# Patient Record
Sex: MALE | Race: WHITE | Employment: OTHER | URBAN - METROPOLITAN AREA
[De-identification: names, ages, dates, MRNs, and addresses within clinical notes are randomized per-mention and may not be internally consistent; named-entity substitution may affect disease eponyms.]

---

## 2017-01-04 ENCOUNTER — TELEPHONE (OUTPATIENT)
Dept: SURGERY | Age: 67
End: 2017-01-04

## 2017-03-17 ENCOUNTER — OFFICE VISIT (OUTPATIENT)
Dept: SURGERY | Age: 67
End: 2017-03-17

## 2017-03-17 VITALS
SYSTOLIC BLOOD PRESSURE: 111 MMHG | WEIGHT: 184 LBS | HEART RATE: 70 BPM | BODY MASS INDEX: 24.92 KG/M2 | HEIGHT: 72 IN | DIASTOLIC BLOOD PRESSURE: 65 MMHG

## 2017-03-17 DIAGNOSIS — Z72.0 TOBACCO ABUSE: ICD-10-CM

## 2017-03-17 DIAGNOSIS — Z93.3 COLOSTOMY IN PLACE (HCC): Primary | ICD-10-CM

## 2017-03-17 PROCEDURE — 99214 OFFICE O/P EST MOD 30 MIN: CPT | Performed by: SURGERY

## 2017-03-17 ASSESSMENT — ENCOUNTER SYMPTOMS
ALLERGIC/IMMUNOLOGIC NEGATIVE: 1
RESPIRATORY NEGATIVE: 1
EYES NEGATIVE: 1

## 2017-03-20 ENCOUNTER — TELEPHONE (OUTPATIENT)
Dept: SURGERY | Age: 67
End: 2017-03-20

## 2017-03-20 NOTE — TELEPHONE ENCOUNTER
Returned the call to the patient, patient states he has an appointment for his H&P scheduled for 3/30/2017, patient has been given a tentative date for surgery of 4/12/2017, patient was ok with the information provided.

## 2017-03-28 ENCOUNTER — TELEPHONE (OUTPATIENT)
Dept: SURGERY | Age: 67
End: 2017-03-28

## 2017-04-10 ENCOUNTER — TELEPHONE (OUTPATIENT)
Dept: SURGERY | Age: 67
End: 2017-04-10

## 2017-04-11 ENCOUNTER — TELEPHONE (OUTPATIENT)
Dept: SURGERY | Age: 67
End: 2017-04-11

## 2017-04-12 ENCOUNTER — PAT TELEPHONE (OUTPATIENT)
Dept: PREADMISSION TESTING | Age: 67
End: 2017-04-12

## 2017-04-12 VITALS — WEIGHT: 180 LBS | HEIGHT: 72 IN | BODY MASS INDEX: 24.38 KG/M2

## 2017-04-12 RX ORDER — ACETAMINOPHEN 160 MG
2000 TABLET,DISINTEGRATING ORAL DAILY
Status: ON HOLD | COMMUNITY
Start: 2017-02-20 | End: 2021-01-27

## 2017-04-13 ENCOUNTER — SURG/PROC ORDERS (OUTPATIENT)
Dept: ANESTHESIOLOGY | Age: 67
End: 2017-04-13

## 2017-04-13 RX ORDER — SODIUM CHLORIDE 0.9 % (FLUSH) 0.9 %
10 SYRINGE (ML) INJECTION PRN
Status: CANCELLED | OUTPATIENT
Start: 2017-04-13

## 2017-04-13 RX ORDER — SODIUM CHLORIDE 9 MG/ML
INJECTION, SOLUTION INTRAVENOUS CONTINUOUS
Status: CANCELLED | OUTPATIENT
Start: 2017-04-13

## 2017-04-13 RX ORDER — SODIUM CHLORIDE 0.9 % (FLUSH) 0.9 %
10 SYRINGE (ML) INJECTION EVERY 12 HOURS SCHEDULED
Status: CANCELLED | OUTPATIENT
Start: 2017-04-13

## 2017-04-24 ENCOUNTER — TELEPHONE (OUTPATIENT)
Dept: SURGERY | Age: 67
End: 2017-04-24

## 2017-05-01 ENCOUNTER — OFFICE VISIT (OUTPATIENT)
Dept: SURGERY | Age: 67
End: 2017-05-01

## 2017-05-01 VITALS
DIASTOLIC BLOOD PRESSURE: 87 MMHG | BODY MASS INDEX: 24.38 KG/M2 | WEIGHT: 180 LBS | HEIGHT: 72 IN | HEART RATE: 79 BPM | SYSTOLIC BLOOD PRESSURE: 135 MMHG

## 2017-05-01 DIAGNOSIS — K63.1 PERFORATED SIGMOID COLON (HCC): Primary | ICD-10-CM

## 2017-05-01 PROCEDURE — 99024 POSTOP FOLLOW-UP VISIT: CPT | Performed by: NURSE PRACTITIONER

## 2017-05-01 ASSESSMENT — ENCOUNTER SYMPTOMS: COLOR CHANGE: 1

## 2017-05-26 ENCOUNTER — OFFICE VISIT (OUTPATIENT)
Dept: SURGERY | Age: 67
End: 2017-05-26

## 2017-05-26 VITALS
BODY MASS INDEX: 25.47 KG/M2 | HEIGHT: 72 IN | WEIGHT: 188 LBS | HEART RATE: 72 BPM | SYSTOLIC BLOOD PRESSURE: 100 MMHG | DIASTOLIC BLOOD PRESSURE: 60 MMHG

## 2017-05-26 DIAGNOSIS — I10 ESSENTIAL HYPERTENSION: ICD-10-CM

## 2017-05-26 DIAGNOSIS — I48.0 PAROXYSMAL ATRIAL FIBRILLATION (HCC): ICD-10-CM

## 2017-05-26 DIAGNOSIS — K43.2 INCISIONAL HERNIA, WITHOUT OBSTRUCTION OR GANGRENE: ICD-10-CM

## 2017-05-26 DIAGNOSIS — Z09 S/P COLOSTOMY, FOLLOW-UP EXAM: Primary | ICD-10-CM

## 2017-05-26 PROCEDURE — 99024 POSTOP FOLLOW-UP VISIT: CPT | Performed by: SURGERY

## 2017-05-26 ASSESSMENT — ENCOUNTER SYMPTOMS
GASTROINTESTINAL NEGATIVE: 1
RESPIRATORY NEGATIVE: 1
ALLERGIC/IMMUNOLOGIC NEGATIVE: 1

## 2017-07-04 PROBLEM — J90 PLEURAL EFFUSION: Status: ACTIVE | Noted: 2017-07-04

## 2017-07-04 PROBLEM — J81.0 ACUTE PULMONARY EDEMA (HCC): Status: ACTIVE | Noted: 2017-07-04

## 2017-07-04 PROBLEM — J96.02 ACUTE RESPIRATORY ACIDOSIS (HCC): Status: ACTIVE | Noted: 2017-07-04

## 2017-07-04 PROBLEM — E78.5 HYPERLIPIDEMIA: Status: ACTIVE | Noted: 2017-07-04

## 2017-07-04 PROBLEM — J43.2 CENTRILOBULAR EMPHYSEMA (HCC): Status: ACTIVE | Noted: 2017-07-04

## 2017-07-04 PROBLEM — J96.02 ACUTE RESPIRATORY FAILURE WITH HYPOXIA AND HYPERCARBIA (HCC): Status: ACTIVE | Noted: 2017-07-04

## 2017-07-04 PROBLEM — I50.23 ACUTE ON CHRONIC SYSTOLIC (CONGESTIVE) HEART FAILURE (HCC): Status: ACTIVE | Noted: 2017-07-04

## 2017-07-04 PROBLEM — J96.01 ACUTE RESPIRATORY FAILURE WITH HYPOXIA AND HYPERCARBIA (HCC): Status: ACTIVE | Noted: 2017-07-04

## 2017-08-03 ENCOUNTER — TELEPHONE (OUTPATIENT)
Dept: SURGERY | Age: 67
End: 2017-08-03

## 2017-08-11 ENCOUNTER — OFFICE VISIT (OUTPATIENT)
Dept: SURGERY | Age: 67
End: 2017-08-11

## 2017-08-11 VITALS
DIASTOLIC BLOOD PRESSURE: 72 MMHG | WEIGHT: 187 LBS | BODY MASS INDEX: 25.33 KG/M2 | HEIGHT: 72 IN | SYSTOLIC BLOOD PRESSURE: 105 MMHG | HEART RATE: 107 BPM

## 2017-08-11 DIAGNOSIS — R10.30 LOWER ABDOMINAL PAIN: Primary | ICD-10-CM

## 2017-08-11 DIAGNOSIS — Z09 S/P COLOSTOMY, FOLLOW-UP EXAM: ICD-10-CM

## 2017-08-11 DIAGNOSIS — K43.2 INCISIONAL HERNIA, WITHOUT OBSTRUCTION OR GANGRENE: ICD-10-CM

## 2017-08-11 PROCEDURE — 4040F PNEUMOC VAC/ADMIN/RCVD: CPT | Performed by: SURGERY

## 2017-08-11 PROCEDURE — 3017F COLORECTAL CA SCREEN DOC REV: CPT | Performed by: SURGERY

## 2017-08-11 PROCEDURE — 4004F PT TOBACCO SCREEN RCVD TLK: CPT | Performed by: SURGERY

## 2017-08-11 PROCEDURE — 1123F ACP DISCUSS/DSCN MKR DOCD: CPT | Performed by: SURGERY

## 2017-08-11 PROCEDURE — G8427 DOCREV CUR MEDS BY ELIG CLIN: HCPCS | Performed by: SURGERY

## 2017-08-11 PROCEDURE — 99213 OFFICE O/P EST LOW 20 MIN: CPT | Performed by: SURGERY

## 2017-08-11 PROCEDURE — G8419 CALC BMI OUT NRM PARAM NOF/U: HCPCS | Performed by: SURGERY

## 2017-08-11 ASSESSMENT — ENCOUNTER SYMPTOMS
NAUSEA: 0
ABDOMINAL PAIN: 1
EYE DISCHARGE: 0
RECTAL PAIN: 0
PHOTOPHOBIA: 0
VOMITING: 0
CONSTIPATION: 0
BACK PAIN: 1
EYE ITCHING: 0
RESPIRATORY NEGATIVE: 1
BLOOD IN STOOL: 0
EYE PAIN: 0
EYE REDNESS: 0
DIARRHEA: 0
ALLERGIC/IMMUNOLOGIC NEGATIVE: 1
ANAL BLEEDING: 0

## 2017-10-19 ENCOUNTER — TELEPHONE (OUTPATIENT)
Dept: SURGERY | Age: 67
End: 2017-10-19

## 2017-10-19 NOTE — TELEPHONE ENCOUNTER
Returned the call to the patient, he indicates he will contact his primary care physician to be scheduled for his Pre-Op H&P as well as his cardiologist for cardiac clearance.  Patients states he will contact our office once the two apts have been scheduled to then schedule the procedure for his hernia repair

## 2017-10-19 NOTE — LETTER
4. I have also been informed by the informing physician that there are other risks from both known and unknown causes that are attendant to the performance of any surgical procedure. I am aware that the practice of medicine and surgery is not an exact science, and that no guarantees have been made to me concerning the results of the operation and/or procedure(s). 5. I   CONSENT / REFUSE CONSENT  (strike the phrase that does not apply) to the taking of photographs before, during and/or after the operation or procedure for scientific/educational purposes. 6. I consent to the administration of anesthesia and to the use of such anesthetics as may be deemed advisable by the anesthesiologist who has been engaged by me or my physician. 7. I certify that I have read and understand the above consent to operation and/or other procedure(s); that the explanations therein referred to were made to me by the informing physician in advance of my signing this consent; that all blanks or statements requiring insertion or completion were filled in and inapplicable paragraphs, if any, were stricken before I signed; and that all questions asked by me about the operation and/or procedure(s) which I have consented to have been fully answered in a satisfactory manner.           _______________________  Witness        Signature Of Patient      _______________________  Informing Physician         Signature of Informing Physician           If patient is unable to sign or is a minor, complete one of the following:    (A)  Patient is a minor   years of age. (B)  Patient is unable to sign because: The undersigned represents that he or she is duly authorized to execute this consent for and on behalf of the above named patient.                Witness               o  Parent  o  Guardian   o  Spouse       o  Other (specify)

## 2017-10-31 ENCOUNTER — TELEPHONE (OUTPATIENT)
Dept: SURGERY | Age: 67
End: 2017-10-31

## 2017-10-31 NOTE — TELEPHONE ENCOUNTER
Patient called stating he had his H&P performed as of 10/30/17 with his PCP, he mentions he has the apt with his cardiologist next week and once he has been cleared for the procedure of Incisional/ Umbilical Hernia Repair with MESH, he will contact our office to be scheduled for the Sx. Patient also mentions he will bring the clearance summary from his PCP to our office next week also.

## 2017-11-06 ENCOUNTER — TELEPHONE (OUTPATIENT)
Dept: SURGERY | Age: 67
End: 2017-11-06

## 2017-11-06 NOTE — TELEPHONE ENCOUNTER
Call has been placed to the patient, advising the patient to return the call regarding scheduling his hernia operation.  Pt did stop into the office today to bring Pre-Op paper work

## 2017-11-10 ENCOUNTER — TELEPHONE (OUTPATIENT)
Dept: SURGERY | Age: 67
End: 2017-11-10

## 2017-11-13 ENCOUNTER — PAT TELEPHONE (OUTPATIENT)
Dept: PREADMISSION TESTING | Age: 67
End: 2017-11-13

## 2017-11-13 ENCOUNTER — TELEPHONE (OUTPATIENT)
Dept: SURGERY | Age: 67
End: 2017-11-13

## 2017-11-13 VITALS — BODY MASS INDEX: 25.06 KG/M2 | HEIGHT: 72 IN | WEIGHT: 185 LBS

## 2017-11-13 ASSESSMENT — PAIN DESCRIPTION - LOCATION: LOCATION: BACK

## 2017-11-13 ASSESSMENT — PAIN - FUNCTIONAL ASSESSMENT: PAIN_FUNCTIONAL_ASSESSMENT: 0-10

## 2017-11-13 ASSESSMENT — PAIN SCALES - GENERAL: PAINLEVEL_OUTOF10: 6

## 2017-11-13 NOTE — PRE-PROCEDURE INSTRUCTIONS
C-Difficile admission screening and protocol:     * Admitted with diarrhea? no     *Prior history of C-Diff. In last 3 months?no     *Antibiotic use in the past 6-8 weeks? no     *Prior hospitalization or nursing home in the last month?    no

## 2017-11-13 NOTE — TELEPHONE ENCOUNTER
Patient would like to know what time to be a hospital for surgery on 11/15.   Please call before 12 (noon) today

## 2017-11-13 NOTE — PRE-PROCEDURE INSTRUCTIONS
wear simple loose fitting clothing to the hospital.  Please do not bring valuables. Do not wear any make-up or nail polish on your fingers or toes      For your safety, please do not wear any jewelry or body piercing's on the day of surgery. All jewelry must be removed. If you have dentures, they will be removed before going to operating room. For your convenience, we will provide you with a container. If you wear contact lenses or glasses, they will be removed, please bring a case for them. If you have a living will and a durable power of  for healthcare, please bring in a copy. As part of our patient safety program to minimize surgical site infections, we ask you to do the following:    · Please notify your surgeon if you develop any illness between         now and the  day of your surgery. · This includes a cough, cold, fever, sore throat, nausea,         or vomiting, and diarrhea, etc.  ·  Please notify your surgeon if you experience dizziness, shortness         of breath or blurred vision between now and the time of your surgery. Do not shave your operative site 96 hours prior to surgery. For face and neck surgery, men may use an electric razor 48 hours   prior to surgery. You may shower the night before surgery or the morning of   your surgery with an antibacterial soap. You will need to bring a photo ID and insurance card    [facility] has an onsite pharmacy, would you like to utilize our pharmacy     If you will be staying overnight and use a C-pap machine, please bring   your C-pap to hospital     Our goal is to provide you with excellent care, therefore, visitors will be limited to two(2) in the room at a time so that we may focus on providing this care for you. Please contact pre-admission testing if you have any further questions.                  [facility] phone number:  [address] PAT fax number:  [phone]  Please note these are generalized instructions for all surgical cases, you may be provided with more specific instructions according to your surgery.

## 2017-11-14 ENCOUNTER — TELEPHONE (OUTPATIENT)
Dept: SURGERY | Age: 67
End: 2017-11-14

## 2017-11-14 NOTE — TELEPHONE ENCOUNTER
Returned the call to the patient advising the patient Per [facility] the patient is ok to proceed with the Hernia operation, he has been advised to STOP taking the xalrelto immediately, patient agreed to this.

## 2017-11-15 PROBLEM — Z87.19 H/O VENTRAL HERNIA: Status: ACTIVE | Noted: 2017-11-15

## 2017-11-17 PROBLEM — Z87.19 HISTORY OF VENTRAL HERNIA: Status: ACTIVE | Noted: 2017-11-17

## 2017-11-29 ENCOUNTER — OFFICE VISIT (OUTPATIENT)
Dept: SURGERY | Age: 67
End: 2017-11-29

## 2017-11-29 VITALS
BODY MASS INDEX: 25.5 KG/M2 | HEART RATE: 80 BPM | SYSTOLIC BLOOD PRESSURE: 121 MMHG | WEIGHT: 188 LBS | DIASTOLIC BLOOD PRESSURE: 83 MMHG

## 2017-11-29 DIAGNOSIS — K43.2 INCISIONAL HERNIA, WITHOUT OBSTRUCTION OR GANGRENE: Primary | ICD-10-CM

## 2017-11-29 PROCEDURE — 99024 POSTOP FOLLOW-UP VISIT: CPT | Performed by: SURGERY

## 2018-03-08 PROBLEM — J96.00 ACUTE RESPIRATORY FAILURE (HCC): Status: ACTIVE | Noted: 2018-03-08

## 2018-06-15 ENCOUNTER — OFFICE VISIT (OUTPATIENT)
Dept: SURGERY | Age: 68
End: 2018-06-15

## 2018-06-15 VITALS
WEIGHT: 196.8 LBS | RESPIRATION RATE: 16 BRPM | OXYGEN SATURATION: 97 % | TEMPERATURE: 97.5 F | SYSTOLIC BLOOD PRESSURE: 118 MMHG | HEART RATE: 86 BPM | DIASTOLIC BLOOD PRESSURE: 82 MMHG | BODY MASS INDEX: 26.66 KG/M2 | HEIGHT: 72 IN

## 2018-06-15 DIAGNOSIS — R10.10 PAIN OF UPPER ABDOMEN: Primary | ICD-10-CM

## 2018-06-15 DIAGNOSIS — K40.90 LEFT INGUINAL HERNIA: ICD-10-CM

## 2018-06-15 PROCEDURE — G8427 DOCREV CUR MEDS BY ELIG CLIN: HCPCS | Performed by: SURGERY

## 2018-06-15 PROCEDURE — 99214 OFFICE O/P EST MOD 30 MIN: CPT | Performed by: SURGERY

## 2018-06-15 PROCEDURE — G8419 CALC BMI OUT NRM PARAM NOF/U: HCPCS | Performed by: SURGERY

## 2018-06-15 PROCEDURE — 3017F COLORECTAL CA SCREEN DOC REV: CPT | Performed by: SURGERY

## 2018-06-15 PROCEDURE — 4004F PT TOBACCO SCREEN RCVD TLK: CPT | Performed by: SURGERY

## 2018-06-15 PROCEDURE — 4040F PNEUMOC VAC/ADMIN/RCVD: CPT | Performed by: SURGERY

## 2018-06-15 PROCEDURE — 1123F ACP DISCUSS/DSCN MKR DOCD: CPT | Performed by: SURGERY

## 2018-06-15 ASSESSMENT — ENCOUNTER SYMPTOMS
RESPIRATORY NEGATIVE: 1
CONSTIPATION: 0
NAUSEA: 0
ALLERGIC/IMMUNOLOGIC NEGATIVE: 1
EYE PAIN: 0
EYE DISCHARGE: 0
ANAL BLEEDING: 0
EYE REDNESS: 0
BLOOD IN STOOL: 0
BACK PAIN: 1
EYE ITCHING: 0
RECTAL PAIN: 0
DIARRHEA: 0
ABDOMINAL PAIN: 1
VOMITING: 0
PHOTOPHOBIA: 0

## 2018-06-27 ENCOUNTER — TELEPHONE (OUTPATIENT)
Dept: SURGERY | Age: 68
End: 2018-06-27

## 2018-06-27 DIAGNOSIS — K40.90 LEFT INGUINAL HERNIA: ICD-10-CM

## 2018-06-27 DIAGNOSIS — Z87.19 H/O VENTRAL HERNIA: ICD-10-CM

## 2018-06-27 DIAGNOSIS — R10.10 PAIN OF UPPER ABDOMEN: Primary | ICD-10-CM

## 2018-06-29 ENCOUNTER — HOSPITAL ENCOUNTER (OUTPATIENT)
Dept: CT IMAGING | Age: 68
Discharge: HOME | End: 2018-06-29
Attending: SURGERY | Admitting: SURGERY

## 2018-06-29 DIAGNOSIS — K40.90 LEFT INGUINAL HERNIA: ICD-10-CM

## 2018-06-29 DIAGNOSIS — R10.10 UPPER ABDOMINAL PAIN: ICD-10-CM

## 2018-06-29 DIAGNOSIS — Z87.19 H/O VENTRAL HERNIA: ICD-10-CM

## 2018-06-29 DIAGNOSIS — R10.10 PAIN OF UPPER ABDOMEN: ICD-10-CM

## 2018-06-29 LAB
ANION GAP SERPL CALCULATED.3IONS-SCNC: 17 MMOL/L (ref 3–16)
BASOPHILS ABSOLUTE: 0.1 K/UL (ref 0–0.2)
BASOPHILS RELATIVE PERCENT: 1 %
BUN BLDV-MCNC: 12 MG/DL (ref 7–20)
CALCIUM SERPL-MCNC: 9.2 MG/DL (ref 8.3–10.6)
CHLORIDE BLD-SCNC: 99 MMOL/L (ref 99–110)
CO2: 23 MMOL/L (ref 21–32)
CREAT SERPL-MCNC: 1.3 MG/DL (ref 0.8–1.3)
EOSINOPHILS ABSOLUTE: 0.2 K/UL (ref 0–0.6)
EOSINOPHILS RELATIVE PERCENT: 1.8 %
GFR AFRICAN AMERICAN: 53
GFR AFRICAN AMERICAN: >60
GFR NON-AFRICAN AMERICAN: 43
GFR NON-AFRICAN AMERICAN: 55
GLUCOSE BLD-MCNC: 127 MG/DL (ref 70–99)
HCT VFR BLD CALC: 42.4 % (ref 40.5–52.5)
HEMOGLOBIN: 14.7 G/DL (ref 13.5–17.5)
LYMPHOCYTES ABSOLUTE: 1.6 K/UL (ref 1–5.1)
LYMPHOCYTES RELATIVE PERCENT: 18.8 %
MCH RBC QN AUTO: 33 PG (ref 26–34)
MCHC RBC AUTO-ENTMCNC: 34.7 G/DL (ref 31–36)
MCV RBC AUTO: 95.1 FL (ref 80–100)
MONOCYTES ABSOLUTE: 0.6 K/UL (ref 0–1.3)
MONOCYTES RELATIVE PERCENT: 6.5 %
NEUTROPHILS ABSOLUTE: 6.1 K/UL (ref 1.7–7.7)
NEUTROPHILS RELATIVE PERCENT: 71.9 %
PDW BLD-RTO: 13.3 % (ref 12.4–15.4)
PERFORMED ON: ABNORMAL
PLATELET # BLD: 260 K/UL (ref 135–450)
PMV BLD AUTO: 8.4 FL (ref 5–10.5)
POC CREATININE: 1.6 MG/DL (ref 0.8–1.3)
POC SAMPLE TYPE: ABNORMAL
POTASSIUM SERPL-SCNC: 4.9 MMOL/L (ref 3.5–5.1)
RBC # BLD: 4.46 M/UL (ref 4.2–5.9)
SODIUM BLD-SCNC: 139 MMOL/L (ref 136–145)
WBC # BLD: 8.5 K/UL (ref 4–11)

## 2018-07-03 ENCOUNTER — TELEPHONE (OUTPATIENT)
Dept: SURGERY | Age: 68
End: 2018-07-03

## 2018-07-03 NOTE — TELEPHONE ENCOUNTER
Call has been returned to the patient, PEr Dr. Shreyas Smith there is no visible hernia of the umbilical region, no bowel blockage, he does have three small cyst of the left kidney, and 3 small cyst of the right kidney. The inguinal hernia that was present at the time of the exam is present but are not causing the pain. He has bee advised the pain he has appears to be related to his back . Previous back surgery has taken place at L2 region, this had been discussed at the previous office visit. He expressed understanding of this information.   He has been advised to see the orthopedic specialist, have them review the CT study

## 2018-07-31 ENCOUNTER — TELEPHONE (OUTPATIENT)
Dept: SURGERY | Age: 68
End: 2018-07-31

## 2018-09-26 PROBLEM — Z09 S/P COLOSTOMY, FOLLOW-UP EXAM: Status: RESOLVED | Noted: 2017-05-26 | Resolved: 2018-09-26

## 2021-01-27 ENCOUNTER — APPOINTMENT (OUTPATIENT)
Dept: GENERAL RADIOLOGY | Age: 71
DRG: 291 | End: 2021-01-27
Payer: COMMERCIAL

## 2021-01-27 ENCOUNTER — HOSPITAL ENCOUNTER (INPATIENT)
Age: 71
LOS: 1 days | Discharge: HOME | DRG: 291 | End: 2021-01-28
Attending: EMERGENCY MEDICINE | Admitting: STUDENT IN AN ORGANIZED HEALTH CARE EDUCATION/TRAINING PROGRAM
Payer: COMMERCIAL

## 2021-01-27 DIAGNOSIS — J96.01 ACUTE RESPIRATORY FAILURE WITH HYPOXIA AND HYPERCAPNIA (HCC): Primary | ICD-10-CM

## 2021-01-27 DIAGNOSIS — J96.02 ACUTE RESPIRATORY FAILURE WITH HYPOXIA AND HYPERCAPNIA (HCC): Primary | ICD-10-CM

## 2021-01-27 LAB
A/G RATIO: 2.1 (ref 1.1–2.2)
ALBUMIN SERPL-MCNC: 4.6 G/DL (ref 3.4–5)
ALP BLD-CCNC: 61 U/L (ref 40–129)
ALT SERPL-CCNC: 12 U/L (ref 10–40)
ANION GAP SERPL CALCULATED.3IONS-SCNC: 22 MMOL/L (ref 3–16)
AST SERPL-CCNC: 21 U/L (ref 15–37)
BASE EXCESS VENOUS: -8.6 MMOL/L
BASE EXCESS VENOUS: 3.9 MMOL/L
BASOPHILS ABSOLUTE: 0.2 K/UL (ref 0–0.2)
BASOPHILS RELATIVE PERCENT: 1.3 %
BILIRUB SERPL-MCNC: 1.1 MG/DL (ref 0–1)
BUN BLDV-MCNC: 13 MG/DL (ref 7–20)
CALCIUM SERPL-MCNC: 9.2 MG/DL (ref 8.3–10.6)
CARBOXYHEMOGLOBIN: 2.4 %
CARBOXYHEMOGLOBIN: 2.5 %
CHLORIDE BLD-SCNC: 94 MMOL/L (ref 99–110)
CO2: 19 MMOL/L (ref 21–32)
CREAT SERPL-MCNC: 1.4 MG/DL (ref 0.8–1.3)
EKG ATRIAL RATE: 70 BPM
EKG DIAGNOSIS: NORMAL
EKG P-R INTERVAL: 224 MS
EKG Q-T INTERVAL: 548 MS
EKG QRS DURATION: 198 MS
EKG QTC CALCULATION (BAZETT): 591 MS
EKG R AXIS: -88 DEGREES
EKG T AXIS: 88 DEGREES
EKG VENTRICULAR RATE: 70 BPM
EOSINOPHILS ABSOLUTE: 0.2 K/UL (ref 0–0.6)
EOSINOPHILS RELATIVE PERCENT: 1.4 %
GFR AFRICAN AMERICAN: >60
GFR NON-AFRICAN AMERICAN: 50
GLOBULIN: 2.2 G/DL
GLUCOSE BLD-MCNC: 157 MG/DL (ref 70–99)
GLUCOSE BLD-MCNC: 169 MG/DL (ref 70–99)
GLUCOSE BLD-MCNC: 188 MG/DL (ref 70–99)
GLUCOSE BLD-MCNC: 207 MG/DL (ref 70–99)
GLUCOSE BLD-MCNC: 239 MG/DL (ref 70–99)
HCO3 VENOUS: 25 MMOL/L (ref 23–29)
HCO3 VENOUS: 32 MMOL/L (ref 23–29)
HCT VFR BLD CALC: 46.9 % (ref 40.5–52.5)
HEMOGLOBIN: 15 G/DL (ref 13.5–17.5)
LACTIC ACID: 2.2 MMOL/L (ref 0.4–2)
LACTIC ACID: 8.9 MMOL/L (ref 0.4–2)
LV EF: 38 %
LVEF MODALITY: NORMAL
LYMPHOCYTES ABSOLUTE: 4 K/UL (ref 1–5.1)
LYMPHOCYTES RELATIVE PERCENT: 22.9 %
MCH RBC QN AUTO: 31.7 PG (ref 26–34)
MCHC RBC AUTO-ENTMCNC: 32 G/DL (ref 31–36)
MCV RBC AUTO: 99.1 FL (ref 80–100)
METHEMOGLOBIN VENOUS: 0.4 %
METHEMOGLOBIN VENOUS: 0.4 %
MONOCYTES ABSOLUTE: 1 K/UL (ref 0–1.3)
MONOCYTES RELATIVE PERCENT: 5.8 %
NEUTROPHILS ABSOLUTE: 11.9 K/UL (ref 1.7–7.7)
NEUTROPHILS RELATIVE PERCENT: 68.6 %
O2 CONTENT, VEN: 4 ML/DL
O2 CONTENT, VEN: 7 ML/DL
O2 SAT, VEN: 19 %
O2 SAT, VEN: 36 %
O2 THERAPY: ABNORMAL
O2 THERAPY: ABNORMAL
PCO2, VEN: 62 MMHG (ref 40–50)
PCO2, VEN: 97.2 MMHG (ref 40–50)
PDW BLD-RTO: 13.8 % (ref 12.4–15.4)
PERFORMED ON: ABNORMAL
PH VENOUS: 7.02 (ref 7.35–7.45)
PH VENOUS: 7.32 (ref 7.35–7.45)
PLATELET # BLD: 236 K/UL (ref 135–450)
PMV BLD AUTO: 9.3 FL (ref 5–10.5)
PO2, VEN: 22 MMHG
PO2, VEN: 23 MMHG
POTASSIUM REFLEX MAGNESIUM: 4.1 MMOL/L (ref 3.5–5.1)
PRO-BNP: 3272 PG/ML (ref 0–124)
RBC # BLD: 4.74 M/UL (ref 4.2–5.9)
SODIUM BLD-SCNC: 135 MMOL/L (ref 136–145)
TCO2 CALC VENOUS: 28 MMOL/L
TCO2 CALC VENOUS: 34 MMOL/L
TOTAL PROTEIN: 6.8 G/DL (ref 6.4–8.2)
TROPONIN: <0.01 NG/ML
WBC # BLD: 17.4 K/UL (ref 4–11)

## 2021-01-27 PROCEDURE — 71045 X-RAY EXAM CHEST 1 VIEW: CPT

## 2021-01-27 PROCEDURE — 36415 COLL VENOUS BLD VENIPUNCTURE: CPT

## 2021-01-27 PROCEDURE — 6360000002 HC RX W HCPCS: Performed by: INTERNAL MEDICINE

## 2021-01-27 PROCEDURE — G0378 HOSPITAL OBSERVATION PER HR: HCPCS

## 2021-01-27 PROCEDURE — 94761 N-INVAS EAR/PLS OXIMETRY MLT: CPT

## 2021-01-27 PROCEDURE — 6370000000 HC RX 637 (ALT 250 FOR IP): Performed by: STUDENT IN AN ORGANIZED HEALTH CARE EDUCATION/TRAINING PROGRAM

## 2021-01-27 PROCEDURE — 6370000000 HC RX 637 (ALT 250 FOR IP): Performed by: INTERNAL MEDICINE

## 2021-01-27 PROCEDURE — 84484 ASSAY OF TROPONIN QUANT: CPT

## 2021-01-27 PROCEDURE — 99222 1ST HOSP IP/OBS MODERATE 55: CPT | Performed by: NURSE PRACTITIONER

## 2021-01-27 PROCEDURE — 93005 ELECTROCARDIOGRAM TRACING: CPT | Performed by: EMERGENCY MEDICINE

## 2021-01-27 PROCEDURE — 96375 TX/PRO/DX INJ NEW DRUG ADDON: CPT

## 2021-01-27 PROCEDURE — 2700000000 HC OXYGEN THERAPY PER DAY

## 2021-01-27 PROCEDURE — 6360000002 HC RX W HCPCS: Performed by: STUDENT IN AN ORGANIZED HEALTH CARE EDUCATION/TRAINING PROGRAM

## 2021-01-27 PROCEDURE — 2580000003 HC RX 258: Performed by: STUDENT IN AN ORGANIZED HEALTH CARE EDUCATION/TRAINING PROGRAM

## 2021-01-27 PROCEDURE — 93306 TTE W/DOPPLER COMPLETE: CPT

## 2021-01-27 PROCEDURE — 94640 AIRWAY INHALATION TREATMENT: CPT

## 2021-01-27 PROCEDURE — 99284 EMERGENCY DEPT VISIT MOD MDM: CPT

## 2021-01-27 PROCEDURE — 6360000002 HC RX W HCPCS: Performed by: EMERGENCY MEDICINE

## 2021-01-27 PROCEDURE — 80053 COMPREHEN METABOLIC PANEL: CPT

## 2021-01-27 PROCEDURE — 82803 BLOOD GASES ANY COMBINATION: CPT

## 2021-01-27 PROCEDURE — 6370000000 HC RX 637 (ALT 250 FOR IP): Performed by: EMERGENCY MEDICINE

## 2021-01-27 PROCEDURE — 83605 ASSAY OF LACTIC ACID: CPT

## 2021-01-27 PROCEDURE — 83880 ASSAY OF NATRIURETIC PEPTIDE: CPT

## 2021-01-27 PROCEDURE — 96365 THER/PROPH/DIAG IV INF INIT: CPT

## 2021-01-27 PROCEDURE — 87449 NOS EACH ORGANISM AG IA: CPT

## 2021-01-27 PROCEDURE — 96376 TX/PRO/DX INJ SAME DRUG ADON: CPT

## 2021-01-27 PROCEDURE — 94660 CPAP INITIATION&MGMT: CPT

## 2021-01-27 PROCEDURE — 2060000000 HC ICU INTERMEDIATE R&B

## 2021-01-27 PROCEDURE — 85025 COMPLETE CBC W/AUTO DIFF WBC: CPT

## 2021-01-27 RX ORDER — ACETAMINOPHEN 650 MG/1
650 SUPPOSITORY RECTAL EVERY 6 HOURS PRN
Status: DISCONTINUED | OUTPATIENT
Start: 2021-01-27 | End: 2021-01-28 | Stop reason: HOSPADM

## 2021-01-27 RX ORDER — ACETAMINOPHEN 325 MG/1
650 TABLET ORAL EVERY 6 HOURS PRN
Status: DISCONTINUED | OUTPATIENT
Start: 2021-01-27 | End: 2021-01-28 | Stop reason: HOSPADM

## 2021-01-27 RX ORDER — SACUBITRIL AND VALSARTAN 24; 26 MG/1; MG/1
1 TABLET, FILM COATED ORAL 2 TIMES DAILY
COMMUNITY

## 2021-01-27 RX ORDER — DEXTROSE MONOHYDRATE 25 G/50ML
12.5 INJECTION, SOLUTION INTRAVENOUS PRN
Status: DISCONTINUED | OUTPATIENT
Start: 2021-01-27 | End: 2021-01-28 | Stop reason: HOSPADM

## 2021-01-27 RX ORDER — VALSARTAN 80 MG/1
20 TABLET ORAL DAILY
Status: DISCONTINUED | OUTPATIENT
Start: 2021-01-27 | End: 2021-01-27

## 2021-01-27 RX ORDER — MAGNESIUM SULFATE IN WATER 40 MG/ML
2000 INJECTION, SOLUTION INTRAVENOUS ONCE
Status: COMPLETED | OUTPATIENT
Start: 2021-01-27 | End: 2021-01-27

## 2021-01-27 RX ORDER — SODIUM CHLORIDE 0.9 % (FLUSH) 0.9 %
10 SYRINGE (ML) INJECTION EVERY 12 HOURS SCHEDULED
Status: DISCONTINUED | OUTPATIENT
Start: 2021-01-27 | End: 2021-01-28 | Stop reason: HOSPADM

## 2021-01-27 RX ORDER — DOXYCYCLINE HYCLATE 100 MG
100 TABLET ORAL EVERY 12 HOURS SCHEDULED
Status: DISCONTINUED | OUTPATIENT
Start: 2021-01-27 | End: 2021-01-28 | Stop reason: HOSPADM

## 2021-01-27 RX ORDER — ALOGLIPTIN 12.5 MG/1
6.25 TABLET, FILM COATED ORAL DAILY
Status: DISCONTINUED | OUTPATIENT
Start: 2021-01-27 | End: 2021-01-28 | Stop reason: HOSPADM

## 2021-01-27 RX ORDER — CARVEDILOL 6.25 MG/1
6.25 TABLET ORAL 2 TIMES DAILY WITH MEALS
Status: DISCONTINUED | OUTPATIENT
Start: 2021-01-27 | End: 2021-01-28 | Stop reason: HOSPADM

## 2021-01-27 RX ORDER — AZITHROMYCIN 500 MG/1
500 TABLET, FILM COATED ORAL DAILY
Status: DISCONTINUED | OUTPATIENT
Start: 2021-01-27 | End: 2021-01-27 | Stop reason: ALTCHOICE

## 2021-01-27 RX ORDER — NICOTINE POLACRILEX 4 MG
15 LOZENGE BUCCAL PRN
Status: DISCONTINUED | OUTPATIENT
Start: 2021-01-27 | End: 2021-01-28 | Stop reason: HOSPADM

## 2021-01-27 RX ORDER — AZITHROMYCIN 250 MG/1
250 TABLET, FILM COATED ORAL DAILY
Status: DISCONTINUED | OUTPATIENT
Start: 2021-01-28 | End: 2021-01-27 | Stop reason: ALTCHOICE

## 2021-01-27 RX ORDER — MONTELUKAST SODIUM 10 MG/1
10 TABLET ORAL NIGHTLY
Status: DISCONTINUED | OUTPATIENT
Start: 2021-01-27 | End: 2021-01-28 | Stop reason: HOSPADM

## 2021-01-27 RX ORDER — AMIODARONE HYDROCHLORIDE 200 MG/1
200 TABLET ORAL DAILY
Status: DISCONTINUED | OUTPATIENT
Start: 2021-01-28 | End: 2021-01-28 | Stop reason: HOSPADM

## 2021-01-27 RX ORDER — OXYCODONE HYDROCHLORIDE AND ACETAMINOPHEN 5; 325 MG/1; MG/1
1 TABLET ORAL EVERY 4 HOURS PRN
Status: DISCONTINUED | OUTPATIENT
Start: 2021-01-27 | End: 2021-01-28 | Stop reason: HOSPADM

## 2021-01-27 RX ORDER — SODIUM CHLORIDE 0.9 % (FLUSH) 0.9 %
10 SYRINGE (ML) INJECTION PRN
Status: DISCONTINUED | OUTPATIENT
Start: 2021-01-27 | End: 2021-01-28 | Stop reason: HOSPADM

## 2021-01-27 RX ORDER — IPRATROPIUM BROMIDE AND ALBUTEROL SULFATE 2.5; .5 MG/3ML; MG/3ML
1 SOLUTION RESPIRATORY (INHALATION) EVERY 4 HOURS PRN
Status: DISCONTINUED | OUTPATIENT
Start: 2021-01-27 | End: 2021-01-28 | Stop reason: HOSPADM

## 2021-01-27 RX ORDER — FUROSEMIDE 10 MG/ML
40 INJECTION INTRAMUSCULAR; INTRAVENOUS 2 TIMES DAILY
Status: DISCONTINUED | OUTPATIENT
Start: 2021-01-27 | End: 2021-01-28 | Stop reason: HOSPADM

## 2021-01-27 RX ORDER — PREDNISONE 20 MG/1
60 TABLET ORAL ONCE
Status: COMPLETED | OUTPATIENT
Start: 2021-01-27 | End: 2021-01-27

## 2021-01-27 RX ORDER — PRAVASTATIN SODIUM 10 MG
10 TABLET ORAL DAILY
Status: DISCONTINUED | OUTPATIENT
Start: 2021-01-27 | End: 2021-01-28 | Stop reason: HOSPADM

## 2021-01-27 RX ORDER — FUROSEMIDE 10 MG/ML
20 INJECTION INTRAMUSCULAR; INTRAVENOUS 2 TIMES DAILY
Status: DISCONTINUED | OUTPATIENT
Start: 2021-01-27 | End: 2021-01-27

## 2021-01-27 RX ORDER — LEVOFLOXACIN 5 MG/ML
750 INJECTION, SOLUTION INTRAVENOUS ONCE
Status: COMPLETED | OUTPATIENT
Start: 2021-01-27 | End: 2021-01-27

## 2021-01-27 RX ORDER — ONDANSETRON 2 MG/ML
4 INJECTION INTRAMUSCULAR; INTRAVENOUS EVERY 6 HOURS PRN
Status: DISCONTINUED | OUTPATIENT
Start: 2021-01-27 | End: 2021-01-28 | Stop reason: HOSPADM

## 2021-01-27 RX ORDER — FUROSEMIDE 10 MG/ML
40 INJECTION INTRAMUSCULAR; INTRAVENOUS ONCE
Status: COMPLETED | OUTPATIENT
Start: 2021-01-27 | End: 2021-01-27

## 2021-01-27 RX ORDER — METHYLPREDNISOLONE SODIUM SUCCINATE 125 MG/2ML
60 INJECTION, POWDER, LYOPHILIZED, FOR SOLUTION INTRAMUSCULAR; INTRAVENOUS EVERY 12 HOURS
Status: DISCONTINUED | OUTPATIENT
Start: 2021-01-27 | End: 2021-01-28 | Stop reason: HOSPADM

## 2021-01-27 RX ORDER — DEXTROSE MONOHYDRATE 50 MG/ML
100 INJECTION, SOLUTION INTRAVENOUS PRN
Status: DISCONTINUED | OUTPATIENT
Start: 2021-01-27 | End: 2021-01-28 | Stop reason: HOSPADM

## 2021-01-27 RX ADMIN — FUROSEMIDE 40 MG: 10 INJECTION, SOLUTION INTRAMUSCULAR; INTRAVENOUS at 16:51

## 2021-01-27 RX ADMIN — INSULIN LISPRO 1 UNITS: 100 INJECTION, SOLUTION INTRAVENOUS; SUBCUTANEOUS at 12:42

## 2021-01-27 RX ADMIN — CARVEDILOL 6.25 MG: 6.25 TABLET, FILM COATED ORAL at 10:07

## 2021-01-27 RX ADMIN — OXYCODONE HYDROCHLORIDE AND ACETAMINOPHEN 1 TABLET: 5; 325 TABLET ORAL at 18:43

## 2021-01-27 RX ADMIN — ALOGLIPTIN 6.25 MG: 12.5 TABLET, FILM COATED ORAL at 10:07

## 2021-01-27 RX ADMIN — SACUBITRIL AND VALSARTAN 1 TABLET: 24; 26 TABLET, FILM COATED ORAL at 10:07

## 2021-01-27 RX ADMIN — INSULIN LISPRO 1 UNITS: 100 INJECTION, SOLUTION INTRAVENOUS; SUBCUTANEOUS at 20:55

## 2021-01-27 RX ADMIN — PRAVASTATIN SODIUM 10 MG: 10 TABLET ORAL at 10:07

## 2021-01-27 RX ADMIN — TIOTROPIUM BROMIDE INHALATION SPRAY 2 PUFF: 3.12 SPRAY, METERED RESPIRATORY (INHALATION) at 09:09

## 2021-01-27 RX ADMIN — OXYCODONE HYDROCHLORIDE AND ACETAMINOPHEN 1 TABLET: 5; 325 TABLET ORAL at 22:48

## 2021-01-27 RX ADMIN — MONTELUKAST 10 MG: 10 TABLET, FILM COATED ORAL at 20:50

## 2021-01-27 RX ADMIN — DOXYCYCLINE HYCLATE 100 MG: 100 TABLET, COATED ORAL at 10:07

## 2021-01-27 RX ADMIN — DOXYCYCLINE HYCLATE 100 MG: 100 TABLET, COATED ORAL at 20:50

## 2021-01-27 RX ADMIN — CARVEDILOL 6.25 MG: 6.25 TABLET, FILM COATED ORAL at 16:51

## 2021-01-27 RX ADMIN — MAGNESIUM SULFATE HEPTAHYDRATE 2000 MG: 40 INJECTION, SOLUTION INTRAVENOUS at 04:56

## 2021-01-27 RX ADMIN — METHYLPREDNISOLONE SODIUM SUCCINATE 60 MG: 125 INJECTION, POWDER, FOR SOLUTION INTRAMUSCULAR; INTRAVENOUS at 20:50

## 2021-01-27 RX ADMIN — FUROSEMIDE 40 MG: 10 INJECTION, SOLUTION INTRAMUSCULAR; INTRAVENOUS at 05:22

## 2021-01-27 RX ADMIN — Medication 10 ML: at 10:11

## 2021-01-27 RX ADMIN — OXYCODONE HYDROCHLORIDE AND ACETAMINOPHEN 1 TABLET: 5; 325 TABLET ORAL at 10:07

## 2021-01-27 RX ADMIN — INSULIN LISPRO 2 UNITS: 100 INJECTION, SOLUTION INTRAVENOUS; SUBCUTANEOUS at 18:43

## 2021-01-27 RX ADMIN — LEVOFLOXACIN 750 MG: 5 INJECTION, SOLUTION INTRAVENOUS at 05:25

## 2021-01-27 RX ADMIN — METHYLPREDNISOLONE SODIUM SUCCINATE 60 MG: 125 INJECTION, POWDER, FOR SOLUTION INTRAMUSCULAR; INTRAVENOUS at 10:10

## 2021-01-27 RX ADMIN — RIVAROXABAN 20 MG: 20 TABLET, FILM COATED ORAL at 10:07

## 2021-01-27 RX ADMIN — Medication 10 ML: at 20:50

## 2021-01-27 RX ADMIN — OXYCODONE HYDROCHLORIDE AND ACETAMINOPHEN 1 TABLET: 5; 325 TABLET ORAL at 14:54

## 2021-01-27 RX ADMIN — SACUBITRIL AND VALSARTAN 1 TABLET: 24; 26 TABLET, FILM COATED ORAL at 20:50

## 2021-01-27 RX ADMIN — PREDNISONE 60 MG: 20 TABLET ORAL at 04:56

## 2021-01-27 ASSESSMENT — PAIN DESCRIPTION - PAIN TYPE
TYPE: CHRONIC PAIN

## 2021-01-27 ASSESSMENT — PAIN DESCRIPTION - DESCRIPTORS
DESCRIPTORS: ACHING;CONSTANT

## 2021-01-27 ASSESSMENT — PAIN SCALES - GENERAL
PAINLEVEL_OUTOF10: 3
PAINLEVEL_OUTOF10: 7
PAINLEVEL_OUTOF10: 3
PAINLEVEL_OUTOF10: 5
PAINLEVEL_OUTOF10: 7
PAINLEVEL_OUTOF10: 0
PAINLEVEL_OUTOF10: 7

## 2021-01-27 ASSESSMENT — PAIN DESCRIPTION - FREQUENCY
FREQUENCY: CONTINUOUS

## 2021-01-27 ASSESSMENT — PAIN DESCRIPTION - LOCATION
LOCATION: BACK

## 2021-01-27 ASSESSMENT — PAIN - FUNCTIONAL ASSESSMENT
PAIN_FUNCTIONAL_ASSESSMENT: ACTIVITIES ARE NOT PREVENTED

## 2021-01-27 ASSESSMENT — PAIN DESCRIPTION - PROGRESSION
CLINICAL_PROGRESSION: GRADUALLY WORSENING
CLINICAL_PROGRESSION: GRADUALLY WORSENING

## 2021-01-27 ASSESSMENT — PAIN DESCRIPTION - ONSET
ONSET: ON-GOING

## 2021-01-27 ASSESSMENT — PAIN DESCRIPTION - ORIENTATION
ORIENTATION: LOWER
ORIENTATION: LOWER

## 2021-01-27 NOTE — PROGRESS NOTES
4 Eyes Skin Assessment     NAME:  Mayela Coombs  YOB: 1950  MEDICAL RECORD NUMBER:  [id number]    The patient is being assess for  Admission    I agree that 2 RN's have performed a thorough Head to Toe Skin Assessment on the patient. ALL assessment sites listed below have been assessed. Areas assessed by both nurses:    all          Does the Patient have a Wound?  No noted wound(s)       Brandin Prevention initiated:  No   Wound Care Orders initiated:  No    Pressure Injury (Stage 3,4, Unstageable, DTI, NWPT, and Complex wounds) if present place consult order under [de-identified] No    New and Established Ostomies if present place consult order under : No      Nurse 1 eSignature: Electronically signed by Escobar Muse RN on 1/27/21 at 2:30 PM EST    **SHARE this note so that the co-signing nurse is able to place an eSignature**    Nurse 2 eSignature: Electronically signed by Kathy Veronica RN on 1/27/21 at 2:31 PM EST

## 2021-01-27 NOTE — CONSULTS
The NP's [facility], EP NP) documentation has been prepared under my direction and personally reviewed by me in its entirety. I confirm that the consultation note created by the NP accurately reflects all work, physical examination, the discussion of treatments and procedures, and medical decision making by the NP. In addition, I have personally met with; performed a physical examination on; discussed the diagnosis (-es), treatment options including procedures, and formulated medical decisions for this patient. In brief, 78year old male with Biotronik BiV ICD who presents with dyspnea and noted to be in CHF, acute, systolic as well as atrial flutter (the latter possibly not new but still yet contributory to the patient's presenting complaint). Leukocytosis and lactic acid elevated, suggestive of infection. Needs that resolved prior to considering ablation for atrial flutter. Also needs diuresis. The patient wishes to discuss these options with his [place], Dr. Milly Thompson. Please refer to the NP's consult note for full details on the assessment and plan. Thank you for allowing us to participate in the care of your patient. If you have any questions, please do not hesitate to contact us.      Briana Trammell MD, MS, [facility], [facility]  Cardiac Electrophysiology  [address]  [address] [facility], [address] [place] [address]  [address] [address]  [phone]

## 2021-01-27 NOTE — CONSULTS
Cardiac Electrophysiology Consultation   Date: 1/27/2021  Admit Date:  1/27/2021  Admission Diagnosis: Acute respiratory failure with hypoxia ([facility] [J96.01]     Reason for Consultation: atrial flutter  Consult Requesting Physician: Fabian Sandoval MD       History of Present Illness  Marilia Denson is a 78y.o. year old male with past medical history significant for persistent atrial fibrillation s/p AV node ablation, atrial flutter, NICMP s/p Biotronik (implatned 2014 with gen change in 2019) Bi-V ICD, systolic HF, HTN, COPD, former drug abuse, tobacco and alcohol abuse who presented to the ED complaining of SOB. He does note some mild, intermittent SOB over the last 2 days. He woke up today around 0200 and was drastically SOB. He called EMS and was noted to have an O2 sat of 50% when they arrived. He required BiPap in the ED. He was noted to be in underlying atrial flutter with a rate of 70 given Bi-V ICD and past AV node ablation. There was also some concerns of possible heart failure and he was given 40mg of IV lasix. He now feels much better, almost back to normal and is off oxygen. He does still smoke and admits to drinking more beer and eating more sodium lately.        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/2017    also 7/4/17    Pacemaker     defibrillator        Past Surgical History:   Procedure Laterality Date    ABDOMEN SURGERY  04/17/2017     BRYANT take down colostomy 2 hr 43 min    APPENDECTOMY      BACK SURGERY      COLONOSCOPY      COLOSTOMY      HERNIA REPAIR  11/15/2017    REPAIR C 15 X 10 CM ventralight St    PACEMAKER PLACEMENT         Current Outpatient Medications   Medication Instructions  albuterol (PROVENTIL) 5 mg, Nebulization, 4 TIMES DAILY PRN    albuterol sulfate HFA (PROAIR HFA) 108 (90 Base) MCG/ACT inhaler 2 puffs, Inhalation, EVERY 6 HOURS PRN    amiodarone (CORDARONE) 200 mg, Oral, DAILY    carvedilol (COREG) 12.5 mg, Oral, 2 TIMES DAILY WITH MEALS    COMBIVENT RESPIMAT  MCG/ACT AERS inhaler 1 puff, Inhalation, PRN    furosemide (LASIX) 40 mg, Oral, 2 TIMES DAILY    montelukast (SINGULAIR) 10 mg, Oral, DAILY    oxyCODONE-acetaminophen (PERCOCET) 5-325 MG per tablet 1-2 tablets, Oral, EVERY 4 HOURS PRN, Takes 2 tabs in am ,1 tab in afternoon and 1 tab at night    pravastatin (PRAVACHOL) 10 mg, Oral, DAILY    rivaroxaban (XARELTO) 20 mg, Oral, DAILY    sacubitril-valsartan (ENTRESTO) 24-26 MG per tablet 1 tablet, Oral, 2 TIMES DAILY    SITagliptin (JANUVIA) 100 mg, Oral, DAILY    Umeclidinium Bromide (INCRUSE ELLIPTA) 62.5 MCG/INH AEPB 1 puff, Inhalation, DAILY        No Known Allergies    Social History:   reports that he has been smoking cigarettes. He has a 25.00 pack-year smoking history. He has never used smokeless tobacco. He reports current alcohol use. He reports that he does not use drugs. Family History:  family history is not on file. Review of Systems:  · General: negative for fever, chills   · Ophthalmic ROS: negative for eye pain or loss of vision  · ENT ROS: negative for headaches, sore throat, nasal drainage  · Respiratory: positive for SOB, negative for cough, sputum  · Cardiovascular: negative for chest pain, palpitations.   · Gastrointestinal: negative for abdominal pain, diarrhea, N/V  · Hematology: negative for bleeding, blood clots, bruising or jaundice  · Genito-Urinary:  negative for dysuria or incontinence  · Musculoskeletal: negative for joint swelling, muscle pain  · Neurological: negative for confusion, dizziness, headaches   · Psychiatric: negative anxiety, depression  · Dermatological: negative for rash    Medications:  Scheduled Meds:

## 2021-01-27 NOTE — ED PROVIDER NOTES
REPAIR C 15 X 10 CM ventralight St    PACEMAKER PLACEMENT         CURRENT MEDICATIONS       Previous Medications    ALBUTEROL (PROVENTIL) (5 MG/ML) 0.5% NEBULIZER SOLUTION    Take 1 mL by nebulization 4 times daily as needed for Wheezing    ALBUTEROL SULFATE HFA (PROAIR HFA) 108 (90 BASE) MCG/ACT INHALER    Inhale 2 puffs into the lungs every 6 hours as needed for Wheezing    AMIODARONE (CORDARONE) 200 MG TABLET    Take 1 tablet by mouth daily    CARVEDILOL (COREG) 12.5 MG TABLET    Take 1 tablet by mouth 2 times daily (with meals)    CHOLECALCIFEROL (VITAMIN D3) 2000 UNITS CAPS    Take 2,000 Units by mouth daily     COMBIVENT RESPIMAT  MCG/ACT AERS INHALER    Inhale 1 puff into the lungs as needed for Wheezing or Shortness of Breath     FUROSEMIDE (LASIX) 40 MG TABLET    Take 1 tablet by mouth 2 times daily    MONTELUKAST (SINGULAIR) 10 MG TABLET    Take 10 mg by mouth daily     OXYCODONE-ACETAMINOPHEN (PERCOCET) 5-325 MG PER TABLET    Take 1-2 tablets by mouth every 4 hours as needed for Pain  Takes 2 tabs in am ,1 tab in afternoon and 1 tab at night. PRAVASTATIN (PRAVACHOL) 10 MG TABLET    Take 10 mg by mouth daily    RIVAROXABAN (XARELTO) 20 MG TABS TABLET    Take 20 mg by mouth daily    SITAGLIPTIN (JANUVIA) 100 MG TABLET    Take 100 mg by mouth daily    SPIRONOLACTONE (ALDACTONE) 25 MG TABLET    Take 1 tablet by mouth daily    SPIRONOLACTONE (ALDACTONE) 25 MG TABLET    Take 1 tablet by mouth daily    UMECLIDINIUM BROMIDE (INCRUSE ELLIPTA) 62.5 MCG/INH AEPB    Inhale 1 puff into the lungs daily    VALSARTAN (DIOVAN) 40 MG TABLET    Take 0.5 tablets by mouth daily       ALLERGIES     Patient has no known allergies.     FAMILY HISTORY      Hx CAD maternal and paternal     SOCIAL HISTORY       Social History     Socioeconomic History    Marital status: Single     Spouse name: Not on file    Number of children: Not on file    Years of education: Not on file    Highest education level: Not on file Occupational History    Not on file   Social Needs    Financial resource strain: Not on file    Food insecurity     Worry: Not on file     Inability: Not on file    Transportation needs     Medical: Not on file     Non-medical: Not on file   Tobacco Use    Smoking status: Current Every Day Smoker     Packs/day: 1.00     Years: 25.00     Pack years: 25.00     Types: Cigarettes    Smokeless tobacco: Never Used   Substance and Sexual Activity    Alcohol use: Yes     Comment: occ    Drug use: No    Sexual activity: Not on file   Lifestyle    Physical activity     Days per week: Not on file     Minutes per session: Not on file    Stress: Not on file   Relationships    Social connections     Talks on phone: Not on file     Gets together: Not on file     Attends [employer] service: Not on file     Active member of club or organization: Not on file     Attends meetings of clubs or organizations: Not on file     Relationship status: Not on file    Intimate partner violence     Fear of current or ex partner: Not on file     Emotionally abused: Not on file     Physically abused: Not on file     Forced sexual activity: Not on file   Other Topics Concern    Not on file   Social History Narrative    Not on file       PHYSICAL EXAM       ED Triage Vitals [01/27/21 0310]   BP Temp Temp Source Pulse Resp SpO2 Height Weight   (!) 152/76 97.6 °F (36.4 °C) Oral 70 (!) 32 100 % -- --       Physical Exam  Vitals signs and nursing note reviewed. Constitutional:       General: He is in acute distress. Appearance: He is well-developed. He is ill-appearing. He is not diaphoretic. HENT:      Head: Normocephalic and atraumatic. Eyes:      General:         Right eye: No discharge. Left eye: No discharge. Pupils: Pupils are equal, round, and reactive to light. Neck:      Musculoskeletal: Normal range of motion. Thyroid: No thyromegaly. Trachea: No tracheal deviation.    Cardiovascular: Rate and Rhythm: Normal rate and regular rhythm. Heart sounds: No murmur. Pulmonary:      Effort: Respiratory distress present. Breath sounds: Wheezing and rales present. Chest:      Chest wall: No tenderness. Abdominal:      General: There is no distension. Palpations: Abdomen is soft. There is no mass. Tenderness: There is no abdominal tenderness. There is no guarding or rebound. Musculoskeletal: Normal range of motion. General: No tenderness or deformity. Skin:     General: Skin is warm. Coloration: Skin is not pale. Findings: No erythema or rash. Neurological:      Mental Status: He is alert. Motor: No abnormal muscle tone. DIAGNOSTIC RESULTS     EKG: All EKG's are interpreted by the Emergency Department Physician who either signs or Co-signs this chart in the absence of acardiologist.    EKG shows Electronic ventricular pacemaker no ectopy noted yesterday changes    RADIOLOGY:   Non-plain film images such as CT, Ultrasoundand MRI are read by the radiologist. Plain radiographic images are visualized and preliminarily interpreted by the emergency physician with the below findings:    Impression   Chronic diffuse interstitial lung prominence and mild blunting of the   costophrenic angle suggesting lung and pleural changes associated with   COPD/emphysema.  Cannot exclude overlying pulmonary interstitial edema.       No evidence of overlying lung consolidation.      ED BEDSIDE ULTRASOUND:   Performed by ED Physician - none    LABS:  Labs Reviewed   CBC WITH AUTO DIFFERENTIAL - Abnormal; Notable for the following components:       Result Value    WBC 17.4 (*)     Neutrophils Absolute 11.9 (*)     All other components within normal limits    Narrative:     Performed at:  [address] [address]   Phone [phone] COMPREHENSIVE METABOLIC PANEL W/ REFLEX TO MG FOR LOW K - Abnormal; Notable for the following components:    Sodium 135 (*)     Chloride 94 (*)     CO2 19 (*)     Anion Gap 22 (*)     Glucose 188 (*)     CREATININE 1.4 (*)     GFR Non- 50 (*)     Total Bilirubin 1.1 (*)     All other components within normal limits    Narrative:     short specimen  Performed at:  [facility]  [address], [place] [employer] [employer] [address]   Phone [phone]   LACTIC ACID, PLASMA - Abnormal; Notable for the following components:    Lactic Acid 8.9 (*)     All other components within normal limits    Narrative:     Nick 195,  Chemistry results called to and read back by jeancarlos joseph rn, 01/27/2021  04:29, by [facility]  Performed at:  [facility]  [address], [place] [employer] [facility] [place] [address]   Phone [phone]   BLOOD GAS, VENOUS - Abnormal; Notable for the following components:    pH, Santy 7.024 (*)     pCO2, Santy 97.2 (*)     All other components within normal limits    Narrative:     Nilay Farias tel. [phone],  Chemistry results called to and read back by alo lang rn, 01/27/2021  04:00, by [facility]  Performed at:  [facility]  [address], [place] [employer] [address]   Phone [phone]   BLOOD GAS, VENOUS - Abnormal; Notable for the following components:    pH, Santy 7.321 (*)     pCO2, Santy 62.0 (*)     HCO3, Venous 32 (*)     All other components within normal limits    Narrative:     Performed at:  [facility]  [address], [place] [employer] [address]   Phone [phone]   TROPONIN    Narrative:     short specimen  Performed at:  [place] [address], [place] [employer]   Phone [phone]   BRAIN NATRIURETIC PEPTIDE   LACTIC ACID, PLASMA All other labs were withinnormal range or not returned as of this dictation. EMERGENCY DEPARTMENT COURSE and DIFFERENTIAL DIAGNOSIS/MDM:     PMH, Surgical Hx, FH, Social Hx reviewed by myself (ETOH usage, Tobacco usage, Drug usage reviewed by myself, no pertinent Hx)- No Pertinent Hx     Old records were reviewed by me    63-year-old male with acute hypoxic hypercapnic respiratory distress. Was likely COPD and CHF component. Patient placed on BiPAP with good response. He was given steroids and nebs by EMS. Repeat blood gas shows improvement. Diuresis started. Levaquin given. Admission to the hospitalist.    CRITICAL CARE TIME   Total Critical Caretime was 39 minutes, excluding separately reportable procedures. There was a high probability of clinically significant/life threatening deterioration in the patient's condition which required my urgent intervention. PROCEDURES:  Unlessotherwise noted below, none    FINAL IMPRESSION      1.  Acute respiratory failure with hypoxia and hypercapnia [facility])          DISPOSITION/PLAN   DISPOSITION Decision To Admit 01/27/2021 05:11:26 AM    (Please note that portions ofthis note were completed with a voice recognition program.  Efforts were made to edit the dictations but occasionally words are mis-transcribed.)    Elisa Patel MD(electronically signed)  Attending Emergency Physician        Elisa Patel MD  01/27/21 9320

## 2021-01-27 NOTE — ED NOTES
Report called to Omayra Mcneil RN on 5N. No further questions.      Blane Councilman, RN  01/27/21 5137

## 2021-01-27 NOTE — ED NOTES
Attempt to call report but charge nurse on 5N states that \"there is no nurse to take patient at this time\". Charge nurse states that she \"will call to get report\" on patient.      Valentina Zayas RN  01/27/21 9134

## 2021-01-27 NOTE — H&P
Hospital Medicine History & Physical      PCP: DENISA GALDAMEZ, APRN - CNP    Date of Admission: 1/27/2021    Date of Service: Pt seen/examined on 1/27/21 and Admitted to Inpatient     Chief Complaint: Shortness of breath    History Of Present Illness: The patient is a 78 y.o. male who presents to [facility] with shortness of breath. Patient states that this morning at 2:00 he woke up gasping for air. Patient states that he felt like he could not expand his lungs past midway. Patient notes that over the past few days he has noticed worsening shortness of breath with exertion. EMS was called and found the patient tripoding and saturating 50% on room air. Patient was placed on CPAP and was not responding well. Patient was then placed on BiPAP and had adequate response. He received steroids along with nebulizers by the EMS. In the emergency department he was also given a Lasix dose. Patient was started on IV antibiotics and he will be admitted to the hospital for what appears to be acute on chronic heart failure exacerbation. Patient denies any fever chills, nausea vomiting, diarrhea constipation.   He did have some chest pain last night along with some shortness of breath    Past Medical History:        Diagnosis Date    A-fib ([address]     COPD (chronic obstructive pulmonary disease) ([address]     Diabetes mellitus ([address]     borderline    Full dentures     Hemorrhoid     Hyperlipidemia     Hypertension     MRSA infection 09/15/2016    sputum    MRSA nasal colonization 04/20/2017    also 7/4/17    Pacemaker     defibrillator       Past Surgical History:        Procedure Laterality Date    ABDOMEN SURGERY  04/17/2017     BRYANT take down colostomy 2 hr 43 min    APPENDECTOMY      BACK SURGERY      COLONOSCOPY      COLOSTOMY      HERNIA REPAIR  11/15/2017 REPAIR C 15 X 10 CM [address]    PACEMAKER PLACEMENT         Medications Prior to Admission:    Prior to Admission medications    Medication Sig Start Date End Date Taking?  Authorizing Provider   sacubitril-valsartan (ENTRESTO) 24-26 MG per tablet Take 1 tablet by mouth 2 times daily   Yes Historical Provider, MD   carvedilol (COREG) 12.5 MG tablet Take 1 tablet by mouth 2 times daily (with meals)  Patient taking differently: Take 25 mg by mouth 2 times daily (with meals)  3/9/18  Yes ALISE Garcia CNP   Umeclidinium Bromide (INCRUSE ELLIPTA) 62.5 MCG/INH AEPB Inhale 1 puff into the lungs daily 3/9/18  Yes ALISE Garcia CNP   albuterol sulfate HFA (PROAIR HFA) 108 (90 Base) MCG/ACT inhaler Inhale 2 puffs into the lungs every 6 hours as needed for Wheezing 3/9/18  Yes ALISE Garcia CNP   albuterol (PROVENTIL) (5 MG/ML) 0.5% nebulizer solution Take 1 mL by nebulization 4 times daily as needed for Wheezing 3/9/18  Yes ALISE Garcia CNP   furosemide (LASIX) 40 MG tablet Take 1 tablet by mouth 2 times daily 7/5/17  Yes Ramirez Holcomb MD   oxyCODONE-acetaminophen (PERCOCET) 5-325 MG per tablet Take 1-2 tablets by mouth every 4 hours as needed for Pain  Takes 2 tabs in am ,1 tab in afternoon and 1 tab at night. 4/22/17  Yes ALISE Ruffin CNP   COMBIVENT RESPIMAT  MCG/ACT AERS inhaler Inhale 1 puff into the lungs as needed for Wheezing or Shortness of Breath  10/21/16  Yes Historical Provider, MD   montelukast (SINGULAIR) 10 MG tablet Take 10 mg by mouth daily  10/27/16  Yes Historical Provider, MD   amiodarone (CORDARONE) 200 MG tablet Take 1 tablet by mouth daily 9/22/16  Yes Silvia Simmons MD   rivaroxaban (XARELTO) 20 MG TABS tablet Take 20 mg by mouth daily   Yes Historical Provider, MD   pravastatin (PRAVACHOL) 10 MG tablet Take 10 mg by mouth daily   Yes Historical Provider, MD sitaGLIPtin (JANUVIA) 100 MG tablet Take 100 mg by mouth daily   Yes Historical Provider, MD       Allergies:  Patient has no known allergies. Social History:  The patient currently lives at home    TOBACCO:   reports that he has been smoking cigarettes. He has a 25.00 pack-year smoking history. He has never used smokeless tobacco.  ETOH:   reports current alcohol use. Family History:  Reviewed in detail and negative for DM, Early CAD, Cancer, CVA. Positive as follows:    No family history on file. REVIEW OF SYSTEMS:   Positive for as noted in the HPI. All other systems reviewed and negative. PHYSICAL EXAM:    /74   Pulse 73   Temp 97.9 °F (36.6 °C) (Oral)   Resp 18   Ht 6' (1.829 m)   Wt 186 lb 11.7 oz (84.7 kg)   SpO2 97%   BMI 25.33 kg/m²     General appearance: No apparent distress appears stated age and cooperative. HEENT Normal cephalic, atraumatic without obvious deformity. Pupils equal, round, and reactive to light. Extra ocular muscles intact. Conjunctivae/corneas clear. Neck: Supple, No jugular venous distention/bruits. Trachea midline without thyromegaly or adenopathy with full range of motion. Lungs: Clear to auscultation, bilaterally without Rales/Wheezes/Rhonchi with good respiratory effort. Heart: Regular rate and rhythm with Normal S1/S2   Abdomen: Soft, non-tender or non-distended without rigidity or guarding and positive bowel sounds all four quadrants. Extremities: No clubbing, cyanosis, or edema bilaterally. Skin: Skin color, texture, turgor normal.  No rashes or lesions. Neurologic: Alert and oriented X 3, neurovascularly intact with sensory/motor intact upper extremities/lower extremities, bilaterally. Cranial nerves: II-XII intact, grossly non-focal.  Mental status: Alert, oriented, thought content appropriate.   Capillary Refill: Acceptable  < 3 seconds  Peripheral Pulses: +3 Easily felt, not easily obliterated with pressure CXR:  I have reviewed the CXR with the following interpretation: Chronic diffuse interstitial lung prominence, when compared to previous there is no real change    EKG:  I have reviewed the EKG with the following interpretation: Atrial flutter    CBC   Recent Labs     01/27/21  0350   WBC 17.4*   HGB 15.0   HCT 46.9         RENAL  Recent Labs     01/27/21  0350   *   K 4.1   CL 94*   CO2 19*   BUN 13   CREATININE 1.4*     LFT'S  Recent Labs     01/27/21  0350   AST 21   ALT 12   BILITOT 1.1*   ALKPHOS 61     COAG  No results for input(s): INR in the last 72 hours.   CARDIAC ENZYMES  Recent Labs     01/27/21  0350   TROPONINI <0.01       U/A:    Lab Results   Component Value Date    COLORU YELLOW 09/11/2016    WBCUA 2 09/11/2016    RBCUA 7 09/11/2016    BACTERIA RARE 09/04/2016    CLARITYU Clear 09/11/2016    SPECGRAV >1.030 09/11/2016    LEUKOCYTESUR Negative 09/11/2016    BLOODU SMALL 09/11/2016    GLUCOSEU >=1000 09/11/2016       ABG    Lab Results   Component Value Date    ZFW2EUO 26.0 03/08/2018    BEART 1.3 03/08/2018    K4KBNUPS 96.5 03/08/2018    PHART 7.397 03/08/2018    XNB9RTL 43.1 03/08/2018    PO2ART 77.5 03/08/2018    SUN8VHG 27.3 03/08/2018         PHYSICIANS CERTIFICATION:    I certify that Pastor Lancaster is expected to be hospitalized for more than 2 midnights based on the following assessment and plan:      ASSESSMENT/PLAN:    Acute respiratory failure with hypoxia and hypercapnia  likely secondary to CHF exacerbation  Currently on 2 L  Titrate oxygen to keep saturations above 90%    Acute on chronic systolic CHF exacerbation  Echo with ejection fraction around 20 to 25%  Patient states he has a biventricular pacer  Continue with IV Lasix  BNP is almost doubled from his baseline  Cardiology consulted    SIRS  Elevated lactic acid, white blood cell count, respirations  No source  Continue with doxycycline    Diabetes mellitus type 2  Continue oral medications  Sliding scale

## 2021-01-28 VITALS
WEIGHT: 186.07 LBS | RESPIRATION RATE: 16 BRPM | BODY MASS INDEX: 25.2 KG/M2 | HEIGHT: 72 IN | HEART RATE: 71 BPM | TEMPERATURE: 97.8 F | OXYGEN SATURATION: 95 % | SYSTOLIC BLOOD PRESSURE: 98 MMHG | DIASTOLIC BLOOD PRESSURE: 61 MMHG

## 2021-01-28 LAB
ANION GAP SERPL CALCULATED.3IONS-SCNC: 11 MMOL/L (ref 3–16)
BASOPHILS ABSOLUTE: 0 K/UL (ref 0–0.2)
BASOPHILS RELATIVE PERCENT: 0 %
BUN BLDV-MCNC: 24 MG/DL (ref 7–20)
CALCIUM SERPL-MCNC: 9.1 MG/DL (ref 8.3–10.6)
CHLORIDE BLD-SCNC: 98 MMOL/L (ref 99–110)
CO2: 27 MMOL/L (ref 21–32)
CREAT SERPL-MCNC: 0.9 MG/DL (ref 0.8–1.3)
EOSINOPHILS ABSOLUTE: 0 K/UL (ref 0–0.6)
EOSINOPHILS RELATIVE PERCENT: 0 %
GFR AFRICAN AMERICAN: >60
GFR NON-AFRICAN AMERICAN: >60
GLUCOSE BLD-MCNC: 172 MG/DL (ref 70–99)
GLUCOSE BLD-MCNC: 189 MG/DL (ref 70–99)
GLUCOSE BLD-MCNC: 212 MG/DL (ref 70–99)
HCT VFR BLD CALC: 45.1 % (ref 40.5–52.5)
HEMOGLOBIN: 14.8 G/DL (ref 13.5–17.5)
L. PNEUMOPHILA SEROGP 1 UR AG: NORMAL
LYMPHOCYTES ABSOLUTE: 0.7 K/UL (ref 1–5.1)
LYMPHOCYTES RELATIVE PERCENT: 6 %
MAGNESIUM: 2.6 MG/DL (ref 1.8–2.4)
MCH RBC QN AUTO: 31.6 PG (ref 26–34)
MCHC RBC AUTO-ENTMCNC: 32.9 G/DL (ref 31–36)
MCV RBC AUTO: 96.1 FL (ref 80–100)
MONOCYTES ABSOLUTE: 0.3 K/UL (ref 0–1.3)
MONOCYTES RELATIVE PERCENT: 2.1 %
NEUTROPHILS ABSOLUTE: 10.8 K/UL (ref 1.7–7.7)
NEUTROPHILS RELATIVE PERCENT: 91.9 %
PDW BLD-RTO: 13.3 % (ref 12.4–15.4)
PERFORMED ON: ABNORMAL
PERFORMED ON: ABNORMAL
PLATELET # BLD: 209 K/UL (ref 135–450)
PMV BLD AUTO: 8.7 FL (ref 5–10.5)
POTASSIUM SERPL-SCNC: 4.3 MMOL/L (ref 3.5–5.1)
RBC # BLD: 4.69 M/UL (ref 4.2–5.9)
SODIUM BLD-SCNC: 136 MMOL/L (ref 136–145)
STREP PNEUMONIAE ANTIGEN, URINE: NORMAL
WBC # BLD: 11.7 K/UL (ref 4–11)

## 2021-01-28 PROCEDURE — 36415 COLL VENOUS BLD VENIPUNCTURE: CPT

## 2021-01-28 PROCEDURE — G0378 HOSPITAL OBSERVATION PER HR: HCPCS

## 2021-01-28 PROCEDURE — 6370000000 HC RX 637 (ALT 250 FOR IP): Performed by: STUDENT IN AN ORGANIZED HEALTH CARE EDUCATION/TRAINING PROGRAM

## 2021-01-28 PROCEDURE — 6370000000 HC RX 637 (ALT 250 FOR IP): Performed by: INTERNAL MEDICINE

## 2021-01-28 PROCEDURE — 6360000002 HC RX W HCPCS: Performed by: INTERNAL MEDICINE

## 2021-01-28 PROCEDURE — 80048 BASIC METABOLIC PNL TOTAL CA: CPT

## 2021-01-28 PROCEDURE — 83735 ASSAY OF MAGNESIUM: CPT

## 2021-01-28 PROCEDURE — 2580000003 HC RX 258: Performed by: STUDENT IN AN ORGANIZED HEALTH CARE EDUCATION/TRAINING PROGRAM

## 2021-01-28 PROCEDURE — 94640 AIRWAY INHALATION TREATMENT: CPT

## 2021-01-28 PROCEDURE — 96376 TX/PRO/DX INJ SAME DRUG ADON: CPT

## 2021-01-28 PROCEDURE — 85025 COMPLETE CBC W/AUTO DIFF WBC: CPT

## 2021-01-28 PROCEDURE — 94760 N-INVAS EAR/PLS OXIMETRY 1: CPT

## 2021-01-28 PROCEDURE — 6360000002 HC RX W HCPCS: Performed by: STUDENT IN AN ORGANIZED HEALTH CARE EDUCATION/TRAINING PROGRAM

## 2021-01-28 RX ORDER — DOXYCYCLINE HYCLATE 100 MG
100 TABLET ORAL EVERY 12 HOURS SCHEDULED
Qty: 10 TABLET | Refills: 0 | Status: SHIPPED | OUTPATIENT
Start: 2021-01-28 | End: 2021-02-02

## 2021-01-28 RX ORDER — CARVEDILOL 6.25 MG/1
6.25 TABLET ORAL 2 TIMES DAILY WITH MEALS
Qty: 60 TABLET | Refills: 3 | Status: SHIPPED | OUTPATIENT
Start: 2021-01-28

## 2021-01-28 RX ADMIN — PRAVASTATIN SODIUM 10 MG: 10 TABLET ORAL at 08:24

## 2021-01-28 RX ADMIN — METHYLPREDNISOLONE SODIUM SUCCINATE 60 MG: 125 INJECTION, POWDER, FOR SOLUTION INTRAMUSCULAR; INTRAVENOUS at 08:23

## 2021-01-28 RX ADMIN — OXYCODONE HYDROCHLORIDE AND ACETAMINOPHEN 1 TABLET: 5; 325 TABLET ORAL at 14:36

## 2021-01-28 RX ADMIN — ALOGLIPTIN 6.25 MG: 12.5 TABLET, FILM COATED ORAL at 08:24

## 2021-01-28 RX ADMIN — OXYCODONE HYDROCHLORIDE AND ACETAMINOPHEN 1 TABLET: 5; 325 TABLET ORAL at 06:11

## 2021-01-28 RX ADMIN — OXYCODONE HYDROCHLORIDE AND ACETAMINOPHEN 1 TABLET: 5; 325 TABLET ORAL at 10:33

## 2021-01-28 RX ADMIN — FUROSEMIDE 40 MG: 10 INJECTION, SOLUTION INTRAMUSCULAR; INTRAVENOUS at 08:23

## 2021-01-28 RX ADMIN — DOXYCYCLINE HYCLATE 100 MG: 100 TABLET, COATED ORAL at 08:24

## 2021-01-28 RX ADMIN — RIVAROXABAN 20 MG: 20 TABLET, FILM COATED ORAL at 08:24

## 2021-01-28 RX ADMIN — Medication 10 ML: at 08:23

## 2021-01-28 RX ADMIN — INSULIN LISPRO 2 UNITS: 100 INJECTION, SOLUTION INTRAVENOUS; SUBCUTANEOUS at 08:42

## 2021-01-28 RX ADMIN — TIOTROPIUM BROMIDE INHALATION SPRAY 2 PUFF: 3.12 SPRAY, METERED RESPIRATORY (INHALATION) at 09:14

## 2021-01-28 RX ADMIN — CARVEDILOL 6.25 MG: 6.25 TABLET, FILM COATED ORAL at 08:24

## 2021-01-28 RX ADMIN — SACUBITRIL AND VALSARTAN 1 TABLET: 24; 26 TABLET, FILM COATED ORAL at 08:24

## 2021-01-28 RX ADMIN — INSULIN LISPRO 1 UNITS: 100 INJECTION, SOLUTION INTRAVENOUS; SUBCUTANEOUS at 13:06

## 2021-01-28 RX ADMIN — AMIODARONE HYDROCHLORIDE 200 MG: 200 TABLET ORAL at 08:24

## 2021-01-28 ASSESSMENT — PAIN SCALES - GENERAL
PAINLEVEL_OUTOF10: 6
PAINLEVEL_OUTOF10: 5

## 2021-01-28 ASSESSMENT — PAIN DESCRIPTION - ONSET: ONSET: ON-GOING

## 2021-01-28 ASSESSMENT — PAIN DESCRIPTION - PROGRESSION: CLINICAL_PROGRESSION: NOT CHANGED

## 2021-01-28 ASSESSMENT — PAIN DESCRIPTION - ORIENTATION: ORIENTATION: LOWER

## 2021-01-28 ASSESSMENT — PAIN DESCRIPTION - DESCRIPTORS: DESCRIPTORS: ACHING;CONSTANT

## 2021-01-28 ASSESSMENT — PAIN DESCRIPTION - PAIN TYPE: TYPE: CHRONIC PAIN

## 2021-01-28 NOTE — DISCHARGE SUMMARY
Hospital Medicine Discharge Summary    Patient ID: Rosa Fine      Patient's PCP: Albaro Gage, APRN - CNP    Admit Date: 1/27/2021     Discharge Date:   1/28/21    Admitting Physician: Bill Juarez DO     Discharge Physician: Destin Richter MD     Discharge Diagnoses: Active Hospital Problems    Diagnosis Date Noted    Acute congestive heart failure ([facility] [I50.9]      Priority: High    Acute respiratory failure with hypoxia (HCC) [J96.01] 01/27/2021    COPD exacerbation ([facility] [J44.1]        The patient was seen and examined on day of discharge and this discharge summary is in conjunction with any daily progress note from day of discharge. Hospital Course: The patient is a 78 y.o. male who presents to [facility] with shortness of breath. Patient states that this morning at 2:00 he woke up gasping for air. Patient states that he felt like he could not expand his lungs past midway. Patient notes that over the past few days he has noticed worsening shortness of breath with exertion. EMS was called and found the patient tripoding and saturating 50% on room air. Patient was placed on CPAP and was not responding well. Patient was then placed on BiPAP and had adequate response. He received steroids along with nebulizers by the EMS. In the emergency department he was also given a Lasix dose. Patient was started on IV antibiotics and he will be admitted to the hospital for what appears to be acute on chronic heart failure exacerbation.     Patient denies any fever chills, nausea vomiting, diarrhea constipation.   He did have some chest pain last night along with some shortness of breath Later on patient admitted to drinking a few too many beers with his buddies watching the conference championship football games this past weekend. This is likely the source of his extra fluid. Patient currently saturating above 90% on room air ambulating around the room without issue. Patient stable for discharge home. We will continue doxycycline in case patient has underlying pneumonia. Acute respiratory failure with hypoxia and hypercapnia  likely secondary to CHF exacerbation  Currently on 2 L  Titrate oxygen to keep saturations above 90%     Acute on chronic systolic CHF exacerbation  Echo with ejection fraction around 20 to 25%  Patient states he has a biventricular pacer  Continue with IV Lasix, discharged on oral Lasix  BNP is almost doubled from his baseline  Cardiology consulted     SIRS  Elevated lactic acid, white blood cell count, respirations  No source  Continue with doxycycline     Diabetes mellitus type 2  Continue oral medications  Sliding scale  Carb control      Exam:     BP 98/61   Pulse 71   Temp 97.8 °F (36.6 °C) (Oral)   Resp 16   Ht 6' (1.829 m)   Wt 186 lb 1.1 oz (84.4 kg)   SpO2 95%   BMI 25.24 kg/m²     General appearance: No apparent distress, appears stated age and cooperative. HEENT: Pupils equal, round, and reactive to light. Conjunctivae/corneas clear. Neck: Supple, with full range of motion. No jugular venous distention. Trachea midline. Respiratory:  Normal respiratory effort. Clear to auscultation, bilaterally without Rales/Wheezes/Rhonchi. Cardiovascular: Regular rate and rhythm with normal S1/S2 without murmurs, rubs or gallops. Abdomen: Soft, non-tender, non-distended with normal bowel sounds. Musculoskeletal: No clubbing, cyanosis or edema bilaterally. Full range of motion without deformity. Skin: Skin color, texture, turgor normal.  No rashes or lesions. Neurologic:  Neurovascularly intact without any focal sensory/motor deficits. Cranial nerves: II-XII intact, grossly non-focal.  Psychiatric: Alert and oriented, thought content appropriate, normal insight      Consults:     IP CONSULT TO CARDIOLOGY    Significant Diagnostic Studies:     XR CHEST PORTABLE   Final Result   Chronic diffuse interstitial lung prominence and mild blunting of the   costophrenic angle suggesting lung and pleural changes associated with   COPD/emphysema. Cannot exclude overlying pulmonary interstitial edema. No evidence of overlying lung consolidation. Disposition: Home    Condition at Discharge: Stable    Discharge Instructions/Follow-up: PCP    Code Status:  Full Code     Activity: activity as tolerated    Diet: cardiac diet      Labs:  For convenience and continuity at follow-up the following most recent labs are provided:      CBC:    Lab Results   Component Value Date    WBC 11.7 01/28/2021    HGB 14.8 01/28/2021    HCT 45.1 01/28/2021     01/28/2021       Renal:    Lab Results   Component Value Date     01/28/2021    K 4.3 01/28/2021    K 4.1 01/27/2021    CL 98 01/28/2021    CO2 27 01/28/2021    BUN 24 01/28/2021    CREATININE 0.9 01/28/2021    CALCIUM 9.1 01/28/2021    PHOS 3.4 03/09/2018       Discharge Medications:     Current Discharge Medication List           Details   doxycycline hyclate (VIBRA-TABS) 100 MG tablet Take 1 tablet by mouth every 12 hours for 5 days  Qty: 10 tablet, Refills: 0              Details   carvedilol (COREG) 6.25 MG tablet Take 1 tablet by mouth 2 times daily (with meals)  Qty: 60 tablet, Refills: 3              Details   sacubitril-valsartan (ENTRESTO) 24-26 MG per tablet Take 1 tablet by mouth 2 times daily      Umeclidinium Bromide (INCRUSE ELLIPTA) 62.5 MCG/INH AEPB Inhale 1 puff into the lungs daily  Qty: 1 each, Refills: 0 albuterol sulfate HFA (PROAIR HFA) 108 (90 Base) MCG/ACT inhaler Inhale 2 puffs into the lungs every 6 hours as needed for Wheezing  Qty: 1 Inhaler, Refills: 3      albuterol (PROVENTIL) (5 MG/ML) 0.5% nebulizer solution Take 1 mL by nebulization 4 times daily as needed for Wheezing  Qty: 120 each, Refills: 3      furosemide (LASIX) 40 MG tablet Take 1 tablet by mouth 2 times daily  Qty: 60 tablet, Refills: 3      oxyCODONE-acetaminophen (PERCOCET) 5-325 MG per tablet Take 1-2 tablets by mouth every 4 hours as needed for Pain  Takes 2 tabs in am ,1 tab in afternoon and 1 tab at night. Qty: 30 tablet, Refills: 0      COMBIVENT RESPIMAT  MCG/ACT AERS inhaler Inhale 1 puff into the lungs as needed for Wheezing or Shortness of Breath       montelukast (SINGULAIR) 10 MG tablet Take 10 mg by mouth daily       amiodarone (CORDARONE) 200 MG tablet Take 1 tablet by mouth daily  Qty: 30 tablet, Refills: 3      rivaroxaban (XARELTO) 20 MG TABS tablet Take 20 mg by mouth daily      pravastatin (PRAVACHOL) 10 MG tablet Take 10 mg by mouth daily      sitaGLIPtin (JANUVIA) 100 MG tablet Take 100 mg by mouth daily             No future appointments. Time Spent on discharge is more than 30 minutes in the examination, evaluation, counseling and review of medications and discharge plan. Signed:    Yoselin Carvalho MD   1/28/2021      Thank you ALISE ZARAGOZA - RANDY for the opportunity to be involved in this patient's care. If you have any questions or concerns please feel free to contact me at [phone].

## 2021-01-28 NOTE — CARE COORDINATION
DISCHARGE PLAN: Pt plans to return home upon d/c. Pt stated that his friend will transport him home. No d/c needs noted. ___________________________________    Met w/pt to address barriers to dc. HOME: Pt reported that he resides alone in an apartment. There are 2+6 KIESHA. Pt stated that he has no difficulty ambulating the steps. Disease Specific: Acute on chronic systolic CHF exacerbation    DME/O2: None    ACTIVE SERVICES: Pt reported that he was independent with all self care PTA and stated that he plans to return home without the need for any assistance. Pt stated that he had been watching sports with his \"buddies\" and drank 8-10 beers and was eating salty foods. Pt stated that he believes that this was the cause of his breathing issues. TRANSPORTATION: Pt reported that he has his license but does not have a car. Pt stated that he has good support from friends and family but is working to save up enough to buy a car. Pt stated that his friend will transport him home from the hospital.     PHARMACY: denies difficulty obtaining/taking meds. Pt has all scripts filled at [facility]. PCP: ALISE Tinoco    DEMOGRAPHICS: verified address/phone number as correct    INSURANCE:  [facility]    HD/PD: No    THERAPY RECS Not ordered    Discharge planning team will remain available for needs. Please consult for any specifics not addressed in this note.     Michele Kaur [place]  [phone]  Electronically signed by Lilibeth West on 1/28/2021 at 12:20 PM

## 2021-01-28 NOTE — PROGRESS NOTES
Dc order noted. Echo came back EF 35-40%. Pharm notified to do dc med rec (pt is on evidence based entresto and coreg). Has ICD in place. Has received education at bedside. HF instructions place on AVS. I made pt a hospital f/u appt with PCP for 2/2 at 3:00 and pt already had a cardiology appt in place with Dr Gonzalo Parnell at [address] [place] [address] for 2/12, 1:30 device check and 2:00 to see Card.

## 2021-01-28 NOTE — PROGRESS NOTES
Physician Progress Note      PATIENT:               Ludin Russell  CSN #:                  [id number]  :                       1950  ADMIT DATE:       2021 3:08 AM  DISCH DATE:  RESPONDING  PROVIDER #:        Chiquita Menon MD          QUERY TEXT:    Patient admitted with Acute on chronic systolic HF and noted to have SIRS   (elevated Lactic acid, RR, WBC. If possible, please document in progress notes   and discharge summary if you are evaluating and/or treating any of the   following specificity of SIRS:      The medical record reflects the following:  Risk Factors: Acute on chronic systolic HF  Clinical Indicators: WBC 17, Lactic acid 8.9, RR 32 , 100% on 50% PAP O2  Treatment: Oxygen, IV Lasix and lab monitoring  Options provided:  -- SIRS of non-infectious origin due to Acute on chronic systolic HF with   Acute respiratory failure (associated acute organ dysfunction)  -- SIRS of non-infectious origin due to Acute on chronic systolic HF without   acute organ dysfunction  -- Other - I will add my own diagnosis  -- Disagree - Not applicable / Not valid  -- Disagree - Clinically unable to determine / Unknown  -- Refer to Clinical Documentation Reviewer    PROVIDER RESPONSE TEXT:    This patient has SIRS of non-infectious origin due to Acute on chronic   systolic HF with Acute respiratory failure (associated acute organ   dysfunction).     Query created by: Kika Le on 2021 12:10 PM      Electronically signed by:  Chiquita Menon MD 2021 8:59 AM

## 2021-01-28 NOTE — PLAN OF CARE
Problem: OXYGENATION/RESPIRATORY FUNCTION  Goal: Patient will maintain patent airway  1/27/2021 2322 by Amy Coronel RN  Outcome: Ongoing  1/27/2021 1401 by Leora Gomez RN  Outcome: Ongoing  Goal: Patient will achieve/maintain normal respiratory rate/effort  Description: Respiratory rate and effort will be within normal limits for the patient  1/27/2021 2322 by Amy Coronel RN  Outcome: Ongoing  1/27/2021 1401 by Leora Gomez RN  Outcome: Ongoing     Problem: HEMODYNAMIC STATUS  Goal: Patient has stable vital signs and fluid balance  1/27/2021 2322 by Amy Coronel RN  Outcome: Ongoing  1/27/2021 1401 by Leora Gomez RN  Outcome: Ongoing     Problem: FLUID AND ELECTROLYTE IMBALANCE  Goal: Fluid and electrolyte balance are achieved/maintained  1/27/2021 2322 by Amy Coronel RN  Outcome: Ongoing  1/27/2021 1401 by Leora Gomez RN  Outcome: Ongoing     Problem: ACTIVITY INTOLERANCE/IMPAIRED MOBILITY  Goal: Mobility/activity is maintained at optimum level for patient  1/27/2021 2322 by Amy Coronel RN  Outcome: Ongoing  1/27/2021 1401 by Leora Gomez RN  Outcome: Ongoing     Problem: Infection:  Goal: Will remain free from infection  Description: Will remain free from infection  1/27/2021 2322 by Amy Coronel RN  Outcome: Ongoing  1/27/2021 1401 by Leora Gomez RN  Outcome: Ongoing     Problem: Safety:  Goal: Free from accidental physical injury  Description: Free from accidental physical injury  1/27/2021 2322 by Amy Coronel RN  Outcome: Ongoing  1/27/2021 1401 by Leora Gomez RN  Outcome: Ongoing  Goal: Free from intentional harm  Description: Free from intentional harm  1/27/2021 2322 by Amy Coronel RN  Outcome: Ongoing  1/27/2021 1401 by Leora Gomez RN  Outcome: Ongoing     Problem: Daily Care:  Goal: Daily care needs are met  Description: Daily care needs are met  1/27/2021 2322 by Amy Coronel RN  Outcome: Ongoing 1/27/2021 1401 by Scottie Walker RN  Outcome: Ongoing     Problem: Pain:  Goal: Patient's pain/discomfort is manageable  Description: Patient's pain/discomfort is manageable  1/27/2021 2322 by Samy Childs RN  Outcome: Ongoing  1/27/2021 1401 by Scottie Walker RN  Outcome: Ongoing     Problem: Skin Integrity:  Goal: Skin integrity will stabilize  Description: Skin integrity will stabilize  1/27/2021 2322 by Samy Childs RN  Outcome: Ongoing  1/27/2021 1401 by Scottie Walker RN  Outcome: Ongoing     Problem: Discharge Planning:  Goal: Patients continuum of care needs are met  Description: Patients continuum of care needs are met  1/27/2021 2322 by Samy Childs RN  Outcome: Ongoing  1/27/2021 1401 by Scottie Walker RN  Outcome: Ongoing     Problem: Falls - Risk of:  Goal: Will remain free from falls  Description: Will remain free from falls  Outcome: Ongoing  Goal: Absence of physical injury  Description: Absence of physical injury  Outcome: Ongoing

## 2021-01-28 NOTE — PROGRESS NOTES
Pharmacy Heart Failure Medication Reconciliation Note    Pt discharged from [facility] today after admission for SOB. Jeffery Grady has a diagnosis of systolic heart failure (last EF = 35-40% on 01/27/21). Pertinent Labs:  BMP:   Lab Results   Component Value Date     01/28/2021    K 4.3 01/28/2021    K 4.1 01/27/2021    CL 98 01/28/2021    CO2 27 01/28/2021    BUN 24 01/28/2021    CREATININE 0.9 01/28/2021     BNP:   Lab Results   Component Value Date    PROBNP 3,272 01/27/2021    PROBNP 1,568 03/09/2018    PROBNP 2,017 03/08/2018       Patient taking an ACEI / ARB / Entresto:  Yes     Patient taking a BETA BLOCKER:  Yes  Patient taking a LOOP DIURETIC: Yes  Patient taking a ALDOSTERONE RECEPTOR ANTAGONIST: No: hx of hyperkalemia    Patient has a diagnosis of Atrial Fibrillation: Yes. If yes, patient is on appropriate anticoagulation: Yes    Patient has a diagnosis of type 2 diabetes:  Yes.  If yes, patient prescribed the following anti-hyperglycemic medication at discharge: Sitagliptin      Corrections to discharge medications include:      Discharge Medications:         Medication List      START taking these medications    doxycycline hyclate 100 MG tablet  Commonly known as: VIBRA-TABS  Take 1 tablet by mouth every 12 hours for 5 days        CHANGE how you take these medications    carvedilol 6.25 MG tablet  Commonly known as: COREG  Take 1 tablet by mouth 2 times daily (with meals)  What changed:   · medication strength  · how much to take        CONTINUE taking these medications    * albuterol sulfate  (90 Base) MCG/ACT inhaler  Commonly known as: ProAir HFA  Inhale 2 puffs into the lungs every 6 hours as needed for Wheezing     * albuterol (5 MG/ML) 0.5% nebulizer solution  Commonly known as: PROVENTIL  Take 1 mL by nebulization 4 times daily as needed for Wheezing     amiodarone 200 MG tablet  Commonly known as: CORDARONE  Take 1 tablet by mouth daily

## 2021-01-28 NOTE — PLAN OF CARE
Problem: OXYGENATION/RESPIRATORY FUNCTION  Goal: Patient will achieve/maintain normal respiratory rate/effort  Description: Respiratory rate and effort will be within normal limits for the patient  1/28/2021 1258 by Nancy Rivera RN  Outcome: Ongoing     Problem: HEMODYNAMIC STATUS  Goal: Patient has stable vital signs and fluid balance  1/28/2021 1258 by Nancy Rivera RN  Outcome: Ongoing     Problem: FLUID AND ELECTROLYTE IMBALANCE  Goal: Fluid and electrolyte balance are achieved/maintained  1/28/2021 1258 by Nancy Rivera RN  Outcome: Ongoing     Problem: ACTIVITY INTOLERANCE/IMPAIRED MOBILITY  Goal: Mobility/activity is maintained at optimum level for patient  1/28/2021 1258 by Nancy Rivera RN  Outcome: Ongoing     Problem: Infection:  Goal: Will remain free from infection  Description: Will remain free from infection  1/28/2021 1258 by Nancy Rivera RN  Outcome: Ongoing     Problem: Safety:  Goal: Free from accidental physical injury  Description: Free from accidental physical injury  1/28/2021 1258 by Nancy Rivera RN  Outcome: Ongoing     Problem: Safety:  Goal: Free from intentional harm  Description: Free from intentional harm  1/28/2021 1258 by Nancy Rivera RN  Outcome: Ongoing     Problem: Daily Care:  Goal: Daily care needs are met  Description: Daily care needs are met  1/28/2021 1258 by Nancy Rivera RN  Outcome: Ongoing     Problem: Pain:  Goal: Patient's pain/discomfort is manageable  Description: Patient's pain/discomfort is manageable  1/28/2021 1258 by Nancy Rivera RN  Outcome: Ongoing   Electronically signed by Mckenna Guevara RN on 1/28/2021 at 12:58 PM

## 2021-01-28 NOTE — DISCHARGE INSTR - DIET
? Good nutrition is important when healing from an illness, injury, or surgery. Follow any nutrition recommendations given to you during your hospital stay. ? If you were given an oral nutrition supplement while in the hospital, continue to take this supplement at home. You can take it with meals, in-between meals, and/or before bedtime. These supplements can be purchased at most local grocery stores, pharmacies, and chain [employer]-stores. ? If you have any questions about your diet or nutrition, call the hospital and ask for the dietitian.

## 2021-01-28 NOTE — PROGRESS NOTES
Patient discharged to home from Room 5125 at 1600. [facility] IV and telemetry removed without incident. Discharge paperwork reviewed with patient and all questions answered.     Electronically signed by Geovanni Walter RN on 1/28/2021 at 5:02 PM

## 2021-02-01 ENCOUNTER — FOLLOWUP TELEPHONE ENCOUNTER (OUTPATIENT)
Dept: INPATIENT UNIT | Age: 71
End: 2021-02-01

## 2021-02-01 NOTE — PROGRESS NOTES
LATE ENTRY:  This HF RN did x3 hospital follow up calls yesterday on Sunday 1/31 but was unable to document due to limited/ no access to Epic. I attempted calls at 9:45 am, 1:30 pm, & 5:30 pm. I was unable to reach pt and unable to leave a secure message. Pt does have a hospital f/u appt scheduled for 2/2 at 3:00 with PCP and a cardiology appt in place for 2/12 for both a device check and Card appt. Both of these were on his AVS at time of discharge.

## 2021-04-12 ENCOUNTER — HOSPITAL ENCOUNTER (EMERGENCY)
Age: 71
Discharge: HOME | End: 2021-04-12
Attending: EMERGENCY MEDICINE
Payer: COMMERCIAL

## 2021-04-12 ENCOUNTER — APPOINTMENT (OUTPATIENT)
Dept: CT IMAGING | Age: 71
End: 2021-04-12
Payer: COMMERCIAL

## 2021-04-12 VITALS
RESPIRATION RATE: 16 BRPM | OXYGEN SATURATION: 97 % | HEART RATE: 70 BPM | DIASTOLIC BLOOD PRESSURE: 68 MMHG | TEMPERATURE: 98.9 F | SYSTOLIC BLOOD PRESSURE: 112 MMHG

## 2021-04-12 DIAGNOSIS — R10.30 LOWER ABDOMINAL PAIN: Primary | ICD-10-CM

## 2021-04-12 LAB
A/G RATIO: 1.6 (ref 1.1–2.2)
ALBUMIN SERPL-MCNC: 4.5 G/DL (ref 3.4–5)
ALP BLD-CCNC: 47 U/L (ref 40–129)
ALT SERPL-CCNC: 11 U/L (ref 10–40)
ANION GAP SERPL CALCULATED.3IONS-SCNC: 10 MMOL/L (ref 3–16)
AST SERPL-CCNC: 17 U/L (ref 15–37)
BASOPHILS ABSOLUTE: 0.1 K/UL (ref 0–0.2)
BASOPHILS RELATIVE PERCENT: 0.6 %
BILIRUB SERPL-MCNC: 0.7 MG/DL (ref 0–1)
BILIRUBIN URINE: NEGATIVE
BLOOD, URINE: ABNORMAL
BUN BLDV-MCNC: 15 MG/DL (ref 7–20)
CALCIUM SERPL-MCNC: 9.3 MG/DL (ref 8.3–10.6)
CHLORIDE BLD-SCNC: 98 MMOL/L (ref 99–110)
CLARITY: CLEAR
CO2: 30 MMOL/L (ref 21–32)
COLOR: YELLOW
CREAT SERPL-MCNC: 1 MG/DL (ref 0.8–1.3)
EKG ATRIAL RATE: 69 BPM
EKG DIAGNOSIS: NORMAL
EKG Q-T INTERVAL: 506 MS
EKG QRS DURATION: 176 MS
EKG QTC CALCULATION (BAZETT): 546 MS
EKG R AXIS: 257 DEGREES
EKG T AXIS: 69 DEGREES
EKG VENTRICULAR RATE: 70 BPM
EOSINOPHILS ABSOLUTE: 0.1 K/UL (ref 0–0.6)
EOSINOPHILS RELATIVE PERCENT: 1.3 %
EPITHELIAL CELLS, UA: 0 /HPF (ref 0–5)
GFR AFRICAN AMERICAN: >60
GFR NON-AFRICAN AMERICAN: >60
GLOBULIN: 2.8 G/DL
GLUCOSE BLD-MCNC: 133 MG/DL (ref 70–99)
GLUCOSE URINE: NEGATIVE MG/DL
HCT VFR BLD CALC: 47.9 % (ref 40.5–52.5)
HEMOGLOBIN: 16.1 G/DL (ref 13.5–17.5)
HYALINE CASTS: 2 /LPF (ref 0–8)
KETONES, URINE: NEGATIVE MG/DL
LACTIC ACID: 1.1 MMOL/L (ref 0.4–2)
LEUKOCYTE ESTERASE, URINE: NEGATIVE
LIPASE: 33 U/L (ref 13–60)
LYMPHOCYTES ABSOLUTE: 2 K/UL (ref 1–5.1)
LYMPHOCYTES RELATIVE PERCENT: 17.9 %
MCH RBC QN AUTO: 31.7 PG (ref 26–34)
MCHC RBC AUTO-ENTMCNC: 33.6 G/DL (ref 31–36)
MCV RBC AUTO: 94.3 FL (ref 80–100)
MICROSCOPIC EXAMINATION: YES
MONOCYTES ABSOLUTE: 0.8 K/UL (ref 0–1.3)
MONOCYTES RELATIVE PERCENT: 6.7 %
NEUTROPHILS ABSOLUTE: 8.3 K/UL (ref 1.7–7.7)
NEUTROPHILS RELATIVE PERCENT: 73.5 %
NITRITE, URINE: NEGATIVE
PDW BLD-RTO: 13.5 % (ref 12.4–15.4)
PH UA: 7.5 (ref 5–8)
PLATELET # BLD: 257 K/UL (ref 135–450)
PMV BLD AUTO: 8.1 FL (ref 5–10.5)
POTASSIUM REFLEX MAGNESIUM: 4.3 MMOL/L (ref 3.5–5.1)
PROTEIN UA: NEGATIVE MG/DL
RBC # BLD: 5.08 M/UL (ref 4.2–5.9)
RBC UA: 4 /HPF (ref 0–4)
SODIUM BLD-SCNC: 138 MMOL/L (ref 136–145)
SPECIFIC GRAVITY UA: >1.03 (ref 1–1.03)
TOTAL PROTEIN: 7.3 G/DL (ref 6.4–8.2)
TROPONIN: <0.01 NG/ML
URINE TYPE: ABNORMAL
UROBILINOGEN, URINE: 1 E.U./DL
WBC # BLD: 11.3 K/UL (ref 4–11)
WBC UA: 0 /HPF (ref 0–5)

## 2021-04-12 PROCEDURE — 96374 THER/PROPH/DIAG INJ IV PUSH: CPT

## 2021-04-12 PROCEDURE — 83605 ASSAY OF LACTIC ACID: CPT

## 2021-04-12 PROCEDURE — 93010 ELECTROCARDIOGRAM REPORT: CPT | Performed by: INTERNAL MEDICINE

## 2021-04-12 PROCEDURE — 93005 ELECTROCARDIOGRAM TRACING: CPT | Performed by: EMERGENCY MEDICINE

## 2021-04-12 PROCEDURE — 80053 COMPREHEN METABOLIC PANEL: CPT

## 2021-04-12 PROCEDURE — 6360000002 HC RX W HCPCS: Performed by: EMERGENCY MEDICINE

## 2021-04-12 PROCEDURE — 83690 ASSAY OF LIPASE: CPT

## 2021-04-12 PROCEDURE — 84484 ASSAY OF TROPONIN QUANT: CPT

## 2021-04-12 PROCEDURE — 6360000004 HC RX CONTRAST MEDICATION: Performed by: EMERGENCY MEDICINE

## 2021-04-12 PROCEDURE — 85025 COMPLETE CBC W/AUTO DIFF WBC: CPT

## 2021-04-12 PROCEDURE — 74177 CT ABD & PELVIS W/CONTRAST: CPT

## 2021-04-12 PROCEDURE — 81001 URINALYSIS AUTO W/SCOPE: CPT

## 2021-04-12 PROCEDURE — 99285 EMERGENCY DEPT VISIT HI MDM: CPT

## 2021-04-12 RX ORDER — MORPHINE SULFATE 2 MG/ML
2 INJECTION, SOLUTION INTRAMUSCULAR; INTRAVENOUS ONCE
Status: COMPLETED | OUTPATIENT
Start: 2021-04-12 | End: 2021-04-12

## 2021-04-12 RX ADMIN — MORPHINE SULFATE 2 MG: 2 INJECTION, SOLUTION INTRAMUSCULAR; INTRAVENOUS at 03:39

## 2021-04-12 RX ADMIN — IOPAMIDOL 75 ML: 755 INJECTION, SOLUTION INTRAVENOUS at 04:26

## 2021-04-12 ASSESSMENT — PAIN - FUNCTIONAL ASSESSMENT
PAIN_FUNCTIONAL_ASSESSMENT: ACTIVITIES ARE NOT PREVENTED
PAIN_FUNCTIONAL_ASSESSMENT: ACTIVITIES ARE NOT PREVENTED

## 2021-04-12 ASSESSMENT — PAIN SCALES - GENERAL
PAINLEVEL_OUTOF10: 9
PAINLEVEL_OUTOF10: 9

## 2021-04-12 ASSESSMENT — PAIN DESCRIPTION - DESCRIPTORS
DESCRIPTORS: SHARP
DESCRIPTORS: DISCOMFORT

## 2021-04-12 ASSESSMENT — PAIN DESCRIPTION - ONSET
ONSET: ON-GOING
ONSET: ON-GOING

## 2021-04-12 ASSESSMENT — PAIN DESCRIPTION - ORIENTATION: ORIENTATION: LOWER;LEFT;RIGHT

## 2021-04-12 ASSESSMENT — PAIN DESCRIPTION - PAIN TYPE
TYPE: ACUTE PAIN
TYPE: ACUTE PAIN

## 2021-04-12 ASSESSMENT — PAIN DESCRIPTION - PROGRESSION: CLINICAL_PROGRESSION: NOT CHANGED

## 2021-04-12 ASSESSMENT — PAIN DESCRIPTION - FREQUENCY: FREQUENCY: CONTINUOUS

## 2021-04-12 NOTE — ED PROVIDER NOTES
[address] [facility]      Pt Name: Baldo Almonte  MRN: [id number]  [place] 1950  Date of evaluation: 4/12/2021  Provider: Merlin Sanchez MD    CHIEF COMPLAINT       Chief Complaint   Patient presents with    Abdominal Pain     lower/ started friday, intermittent         HISTORY OF PRESENT ILLNESS   (Location/Symptom, Timing/Onset,Context/Setting, Quality, Duration, Modifying Factors, Severity)  Note limiting factors. Baldo Almonte is a 78 y.o. male who presents to the emergency department for evaluation of abdominal pain. Patient reports he had some lower abdominal discomfort on Friday, he states that he thought he was constipated, so he took some laxative and then moved his bowels and felt better. States he felt all right throughout the day yesterday and then last night around 7 PM he had increasing bilateral lower abdominal pain. Patient describes as a dull ache, seems to be equal both sides. He reports a tightness throughout his entire torso as well, and does feel his abdomen is bloated. He states he has not moved his bowels since Friday. He denies any associated nausea or vomiting. Patient does have a history of perforated rectosigmoid colon which was resected in 2016 by Dr. Rishi Curry. Patient states that the pain he experienced at that time is the most similar to what he was having now. Of note, patient had an initial negative CT scan at that time. NursingNotes were reviewed. REVIEW OF SYSTEMS    (2-9 systems for level 4, 10 or more for level 5)       Constitutional: No fever or chills. Eye: No visual disturbances. No eye pain. Ear/Nose/Mouth/Throat: No nasal congestion. No sore throat. Respiratory: No cough, No shortness of breath, No sputum production. Cardiovascular: No chest pain. No palpitations. Gastrointestinal: Bilateral lower abdominal pain. No nausea or vomiting  Genitourinary: No dysuria.  No hematuria. Hematology/Lymphatics: No bleeding or bruising tendency. Immunologic: No malaise. No swollen glands. Musculoskeletal: No back pain. No joint pain. Integumentary: No rash. No abrasions. Neurologic: No headache. No focal numbness or weakness. 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    also 7/4/17    Pacemaker     defibrillator         SURGICALHISTORY       Past Surgical History:   Procedure Laterality Date    ABDOMEN SURGERY  04/17/2017     BRYANT take down colostomy 2 hr 43 min    APPENDECTOMY      BACK SURGERY      COLONOSCOPY      COLOSTOMY      HERNIA REPAIR  11/15/2017    REPAIR C 15 X 10 CM ventralight St    PACEMAKER PLACEMENT           CURRENT MEDICATIONS       Previous Medications    ALBUTEROL (PROVENTIL) (5 MG/ML) 0.5% NEBULIZER SOLUTION    Take 1 mL by nebulization 4 times daily as needed for Wheezing    ALBUTEROL SULFATE HFA (PROAIR HFA) 108 (90 BASE) MCG/ACT INHALER    Inhale 2 puffs into the lungs every 6 hours as needed for Wheezing    AMIODARONE (CORDARONE) 200 MG TABLET    Take 1 tablet by mouth daily    CARVEDILOL (COREG) 6.25 MG TABLET    Take 1 tablet by mouth 2 times daily (with meals)    COMBIVENT RESPIMAT  MCG/ACT AERS INHALER    Inhale 1 puff into the lungs as needed for Wheezing or Shortness of Breath     FUROSEMIDE (LASIX) 40 MG TABLET    Take 1 tablet by mouth 2 times daily    MONTELUKAST (SINGULAIR) 10 MG TABLET    Take 10 mg by mouth daily     OXYCODONE-ACETAMINOPHEN (PERCOCET) 5-325 MG PER TABLET    Take 1-2 tablets by mouth every 4 hours as needed for Pain  Takes 2 tabs in am ,1 tab in afternoon and 1 tab at night.     PRAVASTATIN (PRAVACHOL) 10 MG TABLET    Take 10 mg by mouth daily    RIVAROXABAN (XARELTO) 20 MG TABS TABLET    Take 20 mg by mouth daily    SACUBITRIL-VALSARTAN (ENTRESTO) 24-26 MG PER TABLET    Take 1 tablet by mouth 2 times daily    SITAGLIPTIN (JANUVIA) 100 MG TABLET    Take 100 mg by mouth daily    UMECLIDINIUM BROMIDE (INCRUSE ELLIPTA) 62.5 MCG/INH AEPB    Inhale 1 puff into the lungs daily       ALLERGIES     Patient has no known allergies. FAMILY HISTORY     No family history on file.        SOCIAL HISTORY       Social History     Socioeconomic History    Marital status: Single     Spouse name: Not on file    Number of children: Not on file    Years of education: Not on file    Highest education level: Not on file   Occupational History    Not on file   Social Needs    Financial resource strain: Not on file    Food insecurity     Worry: Not on file     Inability: Not on file    Transportation needs     Medical: Not on file     Non-medical: Not on file   Tobacco Use    Smoking status: Current Every Day Smoker     Packs/day: 1.00     Years: 25.00     Pack years: 25.00     Types: Cigarettes    Smokeless tobacco: Never Used   Substance and Sexual Activity    Alcohol use: Yes     Comment: occ    Drug use: No    Sexual activity: Not on file   Lifestyle    Physical activity     Days per week: Not on file     Minutes per session: Not on file    Stress: Not on file   Relationships    Social connections     Talks on phone: Not on file     Gets together: Not on file     Attends Gnosticist service: Not on file     Active member of club or organization: Not on file     Attends meetings of clubs or organizations: Not on file     Relationship status: Not on file    Intimate partner violence     Fear of current or ex partner: Not on file     Emotionally abused: Not on file     Physically abused: Not on file     Forced sexual activity: Not on file   Other Topics Concern    Not on file   Social History Narrative    Not on file       SCREENINGS PHYSICAL EXAM    (up to 7 for level 4, 8 or more for level 5)     ED Triage Vitals   BP Temp Temp src Pulse Resp SpO2 Height Weight   -- -- -- -- -- -- -- --       General: Alert and oriented, No acute distress. Eye: Normal conjunctiva. Pupils equal and reactive. HENT: Oral mucosa is moist.  Respiratory: Lungs are clear to auscultation, Respirations are non-labored. Cardiovascular: Normal rate, Regular rhythm. Gastrointestinal: Soft, mild distention, mild reproducible tenderness in the bilateral lower quadrants without rebound or guarding. Musculoskeletal: No swelling. Integumentary: Warm, Dry. Neurologic: Alert, Oriented, No focal defects. Psychiatric: Cooperative.     DIAGNOSTIC RESULTS     EKG: All EKG's are interpreted by the Emergency Department Physician who either signs or Co-signsthis chart in the absence of a cardiologist.    Per my interpretation:    Electrocardiogram (ECG) 4/12/2021 402  RATE: normal  RHYTHM: Ventricular paced rhythm  AXIS: normal  INTERVALS: normal  ST-T WAVE CHANGES: No evidence of ST segment elevation or T-wave inversion  Prior for comparison 1/27/2021    RADIOLOGY:   Non-plain filmimages such as CT, Ultrasound and MRI are read by the radiologist. Plain radiographic images are visualized and preliminarily interpreted by the emergency physician with the below findings:      Interpretation per the Radiologist below, if available at the time ofthis note:    CT ABDOMEN PELVIS W IV CONTRAST Additional Contrast? None   Final Result   No acute intra-abdominal abnormality               ED BEDSIDE ULTRASOUND:   Performed by ED Physician - none    LABS:  Labs Reviewed   CBC WITH AUTO DIFFERENTIAL - Abnormal; Notable for the following components:       Result Value    WBC 11.3 (*)     Neutrophils Absolute 8.3 (*)     All other components within normal limits    Narrative:     Performed at:  [facility]  [address] [place] [address] Phone [phone]   COMPREHENSIVE METABOLIC PANEL W/ REFLEX TO MG FOR LOW K - Abnormal; Notable for the following components:    Chloride 98 (*)     Glucose 133 (*)     All other components within normal limits    Narrative:     Performed at:  [facility]  [address] [address]   Phone [phone]   URINALYSIS - Abnormal; Notable for the following components:    Blood, Urine SMALL (*)     All other components within normal limits    Narrative:     Performed at:  [place] [address], [facility] [address]   Phone [phone]   LACTIC ACID, PLASMA    Narrative:     Performed at:  [address] [facility] [address]   Phone [phone]   TROPONIN    Narrative:     Performed at:  [facility]  [address] [place] [address] [facility] [address]   Phone [phone]   LIPASE    Narrative:     Performed at:  [facility]  [address] [place] [address] [facility] [place] [address]   Phone [phone]   MICROSCOPIC URINALYSIS    Narrative:     Performed at:  [facility]  [address] [place] [address] [facility] [address]   Phone [phone]       All other labs were within normal range or not returned as of this dictation. EMERGENCY DEPARTMENT COURSE and DIFFERENTIAL DIAGNOSIS/MDM:   Vitals:    Vitals:    04/12/21 0323 04/12/21 0348 04/12/21 0401 04/12/21 0417   BP: 99/80 104/78 109/70 117/74   Pulse: 70      Resp: 16      Temp: 98.9 °F (37.2 °C)      TempSrc: Oral      SpO2: 100% 100% 100% 93%         Medical decision making: This is a 73-year-old male who presents for evaluation of lower abdominal pain which occurred on Friday, improved Saturday, and then recurred on Sunday.   Patient states that it is somewhat similar to pain that he in 2016 when he had to have emergent surgery for bowel perforation. On exam here he is well-appearing, afebrile, with normal vital signs. Differential diagnosis includes bowel obstruction, diverticulitis, appendicitis, urinary tract infection, atypical ACS. EKG shows no evidence of acute ischemia, troponin is negative. CBC shows mild leukocytosis of 11.3, lactate is normal.  CMP is unremarkable. Lipase is normal.  Urinalysis is noninfectious. CT of the abdomen pelvis shows no acute intra-abdominal pathology. Patient reports improvement after morphine administration. He does report that this episode of pain feels similar to when he was admitted and acutely became septic requiring emergent exploratory laparotomy and was found to have a rectosigmoid perforation in 2016. Therefore I did consult with general surgery, Dr. Jorge Alberto Ayala who feels that it is extremely unlikely that the patient would have a similar benign presentation and such a rare complication and he does feel comfortable that the patient can be discharged home with close outpatient follow-up. Patient is given strict return precautions that if he develops any worsening or change in location of abdominal pain, any fevers, sweating, vomiting he needs to return immediately to the emergency department. Otherwise recommended follow-up with with primary care Dr. Parisa Newsome within the next few days. He does take Percocet for back pain, and is counseled to take stool softeners whenever he takes this medication as it can cause constipation causing similar pain. Patient is understanding and agreeable with plan of care, discharged in stable condition. CRITICAL CARE TIME   Total Critical Care time was 0 minutes, excluding separately reportable procedures. There was a high probability of clinically significant/life threatening deterioration in the patient's condition which required my urgent intervention.       CONSULTS:  IP CONSULT TO GENERAL SURGERY    PROCEDURES:  Unless otherwise noted below, none         FINAL IMPRESSION      1. Lower abdominal pain          DISPOSITION/PLAN   DISPOSITION Decision To Discharge 04/12/2021 06:20:13 AM      PATIENT REFERRED TO:  ALISE Agosto - CNP  [address] [place] [address]  [phone]    Schedule an appointment as soon as possible for a visit in 2 days      Alexi Ingram MD  [address] [facility]  [place] [address]  [phone]    Schedule an appointment as soon as possible for a visit in 2 days        DISCHARGE MEDICATIONS:  New Prescriptions    No medications on file          (Please note that portions of this note were completed with a voice recognition program.Efforts were made to edit the dictations but occasionally words are mis-transcribed.)    Anny Rivas MD (electronically signed)  Attending Emergency Physician        Anny Rivas MD  04/12/21 8796

## 2021-04-12 NOTE — ED NOTES
Patient presents to ED via EMS from home for complaints of lower abdominal pain since Friday. Pt reports Friday he did take a laxative and had a BM, woke up sat with no pain. Pain started again tonight, 9/10. Pt does have past colon surgery and hernia repair. Pacemaker noted. Pt is alert and oriented x4, skin warm and dry, respirations even and easy. No signs of acute distress noted. Call light within reach.       Anneliese Sneed RN  04/12/21 6059

## 2021-04-12 NOTE — ED NOTES
Discharge instructions discussed/given to pt, all questions answered. Pt is alert and oriented x 4, skin warm and dry, respirations even and easy. No signs of acute distress noted. Pt left ambulatory, steady gait.       Lashanda Christensen RN  04/12/21 8673

## 2021-04-19 ENCOUNTER — OFFICE VISIT (OUTPATIENT)
Dept: SURGERY | Age: 71
End: 2021-04-19

## 2021-04-19 VITALS
WEIGHT: 186 LBS | DIASTOLIC BLOOD PRESSURE: 80 MMHG | BODY MASS INDEX: 25.19 KG/M2 | HEIGHT: 72 IN | SYSTOLIC BLOOD PRESSURE: 120 MMHG

## 2021-04-19 DIAGNOSIS — R10.10 PAIN OF UPPER ABDOMEN: Primary | ICD-10-CM

## 2021-04-19 PROCEDURE — 99999 PR OFFICE/OUTPT VISIT,PROCEDURE ONLY: CPT | Performed by: SURGERY

## 2021-04-19 NOTE — PROGRESS NOTES
Jenni Peabody (:  1950) is a 78 y.o. male,Established patient, here for evaluation of the following chief complaint(s):  Abdominal pain    ASSESSMENT/PLAN:  1. Pain of upper abdomen      Resolved. Follow up with me as needed    SUBJECTIVE/OBJECTIVE:  HPI  Known from previous surgery with Dr. Ximena Lemons years ago. Seen in ER last week for abdominal pain after eating spicy food. CT was negative and pain has resolved. No issues currently. Follow up with me as needed    Review of Systems    Physical Exam      Electronically signed by Ilana York MD on 2021 at 2:03 PM        An electronic signature was used to authenticate this note.     --Ilana York MD

## 2021-05-19 ENCOUNTER — HOSPITAL ENCOUNTER (OUTPATIENT)
Dept: CT IMAGING | Age: 71
Discharge: HOME | End: 2021-05-19
Payer: COMMERCIAL

## 2021-05-19 ENCOUNTER — TELEPHONE (OUTPATIENT)
Dept: CASE MANAGEMENT | Age: 71
End: 2021-05-19

## 2021-05-19 DIAGNOSIS — F17.210 CIGARETTE SMOKER: ICD-10-CM

## 2021-05-19 PROCEDURE — 71271 CT THORAX LUNG CANCER SCR C-: CPT

## 2021-05-19 NOTE — TELEPHONE ENCOUNTER
Updated smoking history with most recent Shared Decision Making information from ordering provider. Patient scheduled for Lung Screen on 5/19/2021 at [facility] [place] & order placed.

## 2021-05-28 ENCOUNTER — TELEPHONE (OUTPATIENT)
Dept: CASE MANAGEMENT | Age: 71
End: 2021-05-28

## 2021-05-28 NOTE — TELEPHONE ENCOUNTER
CT Lung Cancer Screening completed on 5/19/2021. Result letter mailed to patient. Smoking history updated.

## 2021-06-24 ENCOUNTER — APPOINTMENT (OUTPATIENT)
Dept: GENERAL RADIOLOGY | Age: 71
DRG: 389 | End: 2021-06-24
Payer: COMMERCIAL

## 2021-06-24 ENCOUNTER — APPOINTMENT (OUTPATIENT)
Dept: CT IMAGING | Age: 71
DRG: 389 | End: 2021-06-24
Payer: COMMERCIAL

## 2021-06-24 ENCOUNTER — HOSPITAL ENCOUNTER (INPATIENT)
Age: 71
LOS: 2 days | Discharge: HOME | DRG: 389 | End: 2021-06-26
Attending: EMERGENCY MEDICINE | Admitting: INTERNAL MEDICINE
Payer: COMMERCIAL

## 2021-06-24 DIAGNOSIS — K56.609 SBO (SMALL BOWEL OBSTRUCTION) (HCC): Primary | ICD-10-CM

## 2021-06-24 PROBLEM — K56.600 PARTIAL SMALL BOWEL OBSTRUCTION (HCC): Status: ACTIVE | Noted: 2021-06-24

## 2021-06-24 LAB
A/G RATIO: 1.8 (ref 1.1–2.2)
ALBUMIN SERPL-MCNC: 4.8 G/DL (ref 3.4–5)
ALP BLD-CCNC: 47 U/L (ref 40–129)
ALT SERPL-CCNC: 11 U/L (ref 10–40)
ANION GAP SERPL CALCULATED.3IONS-SCNC: 14 MMOL/L (ref 3–16)
AST SERPL-CCNC: 17 U/L (ref 15–37)
BASOPHILS ABSOLUTE: 0.1 K/UL (ref 0–0.2)
BASOPHILS RELATIVE PERCENT: 0.4 %
BILIRUB SERPL-MCNC: 1.1 MG/DL (ref 0–1)
BILIRUBIN URINE: NEGATIVE
BLOOD, URINE: ABNORMAL
BUN BLDV-MCNC: 19 MG/DL (ref 7–20)
CALCIUM SERPL-MCNC: 9.7 MG/DL (ref 8.3–10.6)
CHLORIDE BLD-SCNC: 95 MMOL/L (ref 99–110)
CLARITY: CLEAR
CO2: 27 MMOL/L (ref 21–32)
COLOR: YELLOW
CREAT SERPL-MCNC: 1.2 MG/DL (ref 0.8–1.3)
EKG ATRIAL RATE: 242 BPM
EKG DIAGNOSIS: NORMAL
EKG Q-T INTERVAL: 536 MS
EKG QRS DURATION: 182 MS
EKG QTC CALCULATION (BAZETT): 578 MS
EKG R AXIS: 242 DEGREES
EKG T AXIS: 53 DEGREES
EKG VENTRICULAR RATE: 70 BPM
EOSINOPHILS ABSOLUTE: 0.1 K/UL (ref 0–0.6)
EOSINOPHILS RELATIVE PERCENT: 0.4 %
EPITHELIAL CELLS, UA: 1 /HPF (ref 0–5)
GFR AFRICAN AMERICAN: >60
GFR NON-AFRICAN AMERICAN: >60
GLOBULIN: 2.7 G/DL
GLUCOSE BLD-MCNC: 189 MG/DL (ref 70–99)
GLUCOSE URINE: NEGATIVE MG/DL
HCT VFR BLD CALC: 49 % (ref 40.5–52.5)
HEMOGLOBIN: 16.8 G/DL (ref 13.5–17.5)
HYALINE CASTS: 11 /LPF (ref 0–8)
KETONES, URINE: NEGATIVE MG/DL
LACTIC ACID: 1.3 MMOL/L (ref 0.4–2)
LEUKOCYTE ESTERASE, URINE: NEGATIVE
LIPASE: 43 U/L (ref 13–60)
LYMPHOCYTES ABSOLUTE: 1.4 K/UL (ref 1–5.1)
LYMPHOCYTES RELATIVE PERCENT: 8.9 %
MCH RBC QN AUTO: 32.3 PG (ref 26–34)
MCHC RBC AUTO-ENTMCNC: 34.2 G/DL (ref 31–36)
MCV RBC AUTO: 94.4 FL (ref 80–100)
MICROSCOPIC EXAMINATION: YES
MONOCYTES ABSOLUTE: 0.5 K/UL (ref 0–1.3)
MONOCYTES RELATIVE PERCENT: 3.3 %
NEUTROPHILS ABSOLUTE: 13.2 K/UL (ref 1.7–7.7)
NEUTROPHILS RELATIVE PERCENT: 87 %
NITRITE, URINE: NEGATIVE
PDW BLD-RTO: 13.6 % (ref 12.4–15.4)
PH UA: 5 (ref 5–8)
PLATELET # BLD: 247 K/UL (ref 135–450)
PMV BLD AUTO: 8.3 FL (ref 5–10.5)
POTASSIUM REFLEX MAGNESIUM: 4.1 MMOL/L (ref 3.5–5.1)
PROTEIN UA: NEGATIVE MG/DL
RBC # BLD: 5.19 M/UL (ref 4.2–5.9)
RBC UA: 3 /HPF (ref 0–4)
SODIUM BLD-SCNC: 136 MMOL/L (ref 136–145)
SPECIFIC GRAVITY UA: >1.03 (ref 1–1.03)
TOTAL PROTEIN: 7.5 G/DL (ref 6.4–8.2)
URINE REFLEX TO CULTURE: ABNORMAL
URINE TYPE: ABNORMAL
UROBILINOGEN, URINE: 0.2 E.U./DL
WBC # BLD: 15.1 K/UL (ref 4–11)
WBC UA: 1 /HPF (ref 0–5)

## 2021-06-24 PROCEDURE — 1200000000 HC SEMI PRIVATE

## 2021-06-24 PROCEDURE — 96374 THER/PROPH/DIAG INJ IV PUSH: CPT

## 2021-06-24 PROCEDURE — 6370000000 HC RX 637 (ALT 250 FOR IP): Performed by: INTERNAL MEDICINE

## 2021-06-24 PROCEDURE — 71045 X-RAY EXAM CHEST 1 VIEW: CPT

## 2021-06-24 PROCEDURE — 93010 ELECTROCARDIOGRAM REPORT: CPT | Performed by: INTERNAL MEDICINE

## 2021-06-24 PROCEDURE — 94640 AIRWAY INHALATION TREATMENT: CPT

## 2021-06-24 PROCEDURE — 2580000003 HC RX 258: Performed by: EMERGENCY MEDICINE

## 2021-06-24 PROCEDURE — APPSS180 APP SPLIT SHARED TIME > 60 MINUTES: Performed by: PHYSICIAN ASSISTANT

## 2021-06-24 PROCEDURE — 83605 ASSAY OF LACTIC ACID: CPT

## 2021-06-24 PROCEDURE — 6360000004 HC RX CONTRAST MEDICATION: Performed by: EMERGENCY MEDICINE

## 2021-06-24 PROCEDURE — 93005 ELECTROCARDIOGRAM TRACING: CPT | Performed by: EMERGENCY MEDICINE

## 2021-06-24 PROCEDURE — 99284 EMERGENCY DEPT VISIT MOD MDM: CPT

## 2021-06-24 PROCEDURE — 36415 COLL VENOUS BLD VENIPUNCTURE: CPT

## 2021-06-24 PROCEDURE — 0D9670Z DRAINAGE OF STOMACH WITH DRAINAGE DEVICE, VIA NATURAL OR ARTIFICIAL OPENING: ICD-10-PCS | Performed by: FAMILY MEDICINE

## 2021-06-24 PROCEDURE — 94761 N-INVAS EAR/PLS OXIMETRY MLT: CPT

## 2021-06-24 PROCEDURE — 96375 TX/PRO/DX INJ NEW DRUG ADDON: CPT

## 2021-06-24 PROCEDURE — 2580000003 HC RX 258: Performed by: INTERNAL MEDICINE

## 2021-06-24 PROCEDURE — 6360000002 HC RX W HCPCS: Performed by: EMERGENCY MEDICINE

## 2021-06-24 PROCEDURE — 6360000002 HC RX W HCPCS: Performed by: FAMILY MEDICINE

## 2021-06-24 PROCEDURE — 74177 CT ABD & PELVIS W/CONTRAST: CPT

## 2021-06-24 PROCEDURE — APPNB180 APP NON BILLABLE TIME > 60 MINS: Performed by: PHYSICIAN ASSISTANT

## 2021-06-24 PROCEDURE — 81001 URINALYSIS AUTO W/SCOPE: CPT

## 2021-06-24 PROCEDURE — 83690 ASSAY OF LIPASE: CPT

## 2021-06-24 PROCEDURE — 99222 1ST HOSP IP/OBS MODERATE 55: CPT | Performed by: SURGERY

## 2021-06-24 PROCEDURE — 85025 COMPLETE CBC W/AUTO DIFF WBC: CPT

## 2021-06-24 PROCEDURE — 80053 COMPREHEN METABOLIC PANEL: CPT

## 2021-06-24 RX ORDER — ACETAMINOPHEN 325 MG/1
650 TABLET ORAL EVERY 4 HOURS PRN
Status: DISCONTINUED | OUTPATIENT
Start: 2021-06-24 | End: 2021-06-26 | Stop reason: HOSPADM

## 2021-06-24 RX ORDER — POLYETHYLENE GLYCOL 3350 17 G/17G
17 POWDER, FOR SOLUTION ORAL DAILY PRN
Status: DISCONTINUED | OUTPATIENT
Start: 2021-06-24 | End: 2021-06-26 | Stop reason: HOSPADM

## 2021-06-24 RX ORDER — FENTANYL CITRATE 50 UG/ML
100 INJECTION, SOLUTION INTRAMUSCULAR; INTRAVENOUS ONCE
Status: COMPLETED | OUTPATIENT
Start: 2021-06-24 | End: 2021-06-24

## 2021-06-24 RX ORDER — SODIUM CHLORIDE 0.9 % (FLUSH) 0.9 %
10 SYRINGE (ML) INJECTION PRN
Status: DISCONTINUED | OUTPATIENT
Start: 2021-06-24 | End: 2021-06-26 | Stop reason: HOSPADM

## 2021-06-24 RX ORDER — MORPHINE SULFATE 4 MG/ML
4 INJECTION, SOLUTION INTRAMUSCULAR; INTRAVENOUS ONCE
Status: COMPLETED | OUTPATIENT
Start: 2021-06-24 | End: 2021-06-24

## 2021-06-24 RX ORDER — OXYCODONE HYDROCHLORIDE AND ACETAMINOPHEN 5; 325 MG/1; MG/1
1 TABLET ORAL EVERY 4 HOURS PRN
COMMUNITY

## 2021-06-24 RX ORDER — ONDANSETRON 2 MG/ML
4 INJECTION INTRAMUSCULAR; INTRAVENOUS EVERY 4 HOURS PRN
Status: DISCONTINUED | OUTPATIENT
Start: 2021-06-24 | End: 2021-06-26 | Stop reason: HOSPADM

## 2021-06-24 RX ORDER — MORPHINE SULFATE 2 MG/ML
1 INJECTION, SOLUTION INTRAMUSCULAR; INTRAVENOUS
Status: DISCONTINUED | OUTPATIENT
Start: 2021-06-24 | End: 2021-06-25

## 2021-06-24 RX ORDER — 0.9 % SODIUM CHLORIDE 0.9 %
1000 INTRAVENOUS SOLUTION INTRAVENOUS ONCE
Status: COMPLETED | OUTPATIENT
Start: 2021-06-24 | End: 2021-06-24

## 2021-06-24 RX ORDER — AMIODARONE HYDROCHLORIDE 200 MG/1
200 TABLET ORAL DAILY
Status: DISCONTINUED | OUTPATIENT
Start: 2021-06-24 | End: 2021-06-24

## 2021-06-24 RX ORDER — ALBUTEROL SULFATE 2.5 MG/3ML
5 SOLUTION RESPIRATORY (INHALATION) EVERY 6 HOURS PRN
Status: DISCONTINUED | OUTPATIENT
Start: 2021-06-24 | End: 2021-06-26 | Stop reason: HOSPADM

## 2021-06-24 RX ORDER — ACETAMINOPHEN 650 MG/1
650 SUPPOSITORY RECTAL EVERY 4 HOURS PRN
Status: DISCONTINUED | OUTPATIENT
Start: 2021-06-24 | End: 2021-06-26 | Stop reason: HOSPADM

## 2021-06-24 RX ORDER — SODIUM CHLORIDE 0.9 % (FLUSH) 0.9 %
10 SYRINGE (ML) INJECTION EVERY 12 HOURS SCHEDULED
Status: DISCONTINUED | OUTPATIENT
Start: 2021-06-24 | End: 2021-06-26 | Stop reason: HOSPADM

## 2021-06-24 RX ORDER — SODIUM CHLORIDE 9 MG/ML
25 INJECTION, SOLUTION INTRAVENOUS PRN
Status: DISCONTINUED | OUTPATIENT
Start: 2021-06-24 | End: 2021-06-26 | Stop reason: HOSPADM

## 2021-06-24 RX ORDER — CARVEDILOL 6.25 MG/1
6.25 TABLET ORAL 2 TIMES DAILY WITH MEALS
Status: DISCONTINUED | OUTPATIENT
Start: 2021-06-24 | End: 2021-06-26 | Stop reason: HOSPADM

## 2021-06-24 RX ORDER — PRAVASTATIN SODIUM 10 MG
10 TABLET ORAL NIGHTLY
Status: DISCONTINUED | OUTPATIENT
Start: 2021-06-24 | End: 2021-06-26 | Stop reason: HOSPADM

## 2021-06-24 RX ORDER — MORPHINE SULFATE 4 MG/ML
4 INJECTION, SOLUTION INTRAMUSCULAR; INTRAVENOUS
Status: DISCONTINUED | OUTPATIENT
Start: 2021-06-24 | End: 2021-06-25

## 2021-06-24 RX ORDER — MONTELUKAST SODIUM 10 MG/1
10 TABLET ORAL DAILY
Status: DISCONTINUED | OUTPATIENT
Start: 2021-06-24 | End: 2021-06-26 | Stop reason: HOSPADM

## 2021-06-24 RX ORDER — ONDANSETRON 2 MG/ML
4 INJECTION INTRAMUSCULAR; INTRAVENOUS ONCE
Status: COMPLETED | OUTPATIENT
Start: 2021-06-24 | End: 2021-06-24

## 2021-06-24 RX ORDER — SODIUM CHLORIDE 9 MG/ML
INJECTION, SOLUTION INTRAVENOUS CONTINUOUS
Status: DISCONTINUED | OUTPATIENT
Start: 2021-06-24 | End: 2021-06-26 | Stop reason: HOSPADM

## 2021-06-24 RX ADMIN — FENTANYL CITRATE 100 MCG: 50 INJECTION, SOLUTION INTRAMUSCULAR; INTRAVENOUS at 06:30

## 2021-06-24 RX ADMIN — SACUBITRIL AND VALSARTAN 1 TABLET: 24; 26 TABLET, FILM COATED ORAL at 12:01

## 2021-06-24 RX ADMIN — TIOTROPIUM BROMIDE INHALATION SPRAY 2 PUFF: 3.12 SPRAY, METERED RESPIRATORY (INHALATION) at 09:37

## 2021-06-24 RX ADMIN — MORPHINE SULFATE 4 MG: 4 INJECTION, SOLUTION INTRAMUSCULAR; INTRAVENOUS at 12:01

## 2021-06-24 RX ADMIN — SACUBITRIL AND VALSARTAN 1 TABLET: 24; 26 TABLET, FILM COATED ORAL at 20:20

## 2021-06-24 RX ADMIN — MORPHINE SULFATE 4 MG: 4 INJECTION, SOLUTION INTRAMUSCULAR; INTRAVENOUS at 06:36

## 2021-06-24 RX ADMIN — CARVEDILOL 6.25 MG: 6.25 TABLET, FILM COATED ORAL at 17:42

## 2021-06-24 RX ADMIN — SODIUM CHLORIDE: 9 INJECTION, SOLUTION INTRAVENOUS at 10:49

## 2021-06-24 RX ADMIN — CARVEDILOL 6.25 MG: 6.25 TABLET, FILM COATED ORAL at 10:56

## 2021-06-24 RX ADMIN — SODIUM CHLORIDE 1000 ML: 9 INJECTION, SOLUTION INTRAVENOUS at 05:06

## 2021-06-24 RX ADMIN — MORPHINE SULFATE 4 MG: 4 INJECTION, SOLUTION INTRAMUSCULAR; INTRAVENOUS at 05:06

## 2021-06-24 RX ADMIN — IOPAMIDOL 75 ML: 755 INJECTION, SOLUTION INTRAVENOUS at 05:16

## 2021-06-24 RX ADMIN — ONDANSETRON 4 MG: 2 INJECTION INTRAMUSCULAR; INTRAVENOUS at 05:06

## 2021-06-24 RX ADMIN — MORPHINE SULFATE 4 MG: 4 INJECTION, SOLUTION INTRAMUSCULAR; INTRAVENOUS at 20:20

## 2021-06-24 RX ADMIN — PRAVASTATIN SODIUM 10 MG: 10 TABLET ORAL at 20:20

## 2021-06-24 ASSESSMENT — PAIN DESCRIPTION - PROGRESSION
CLINICAL_PROGRESSION: GRADUALLY IMPROVING
CLINICAL_PROGRESSION: NOT CHANGED
CLINICAL_PROGRESSION: GRADUALLY WORSENING

## 2021-06-24 ASSESSMENT — PAIN DESCRIPTION - ORIENTATION
ORIENTATION: LOWER
ORIENTATION: MID
ORIENTATION: LOWER

## 2021-06-24 ASSESSMENT — ENCOUNTER SYMPTOMS
ANAL BLEEDING: 0
SHORTNESS OF BREATH: 0
CONSTIPATION: 0
COUGH: 0
EYE REDNESS: 0
EYE ITCHING: 0
PHOTOPHOBIA: 0
NAUSEA: 1
STRIDOR: 0
EYE PAIN: 0
BLOOD IN STOOL: 0
APNEA: 0
DIARRHEA: 0
COLOR CHANGE: 0
WHEEZING: 0
ABDOMINAL PAIN: 1
RECTAL PAIN: 0
CHEST TIGHTNESS: 0
ABDOMINAL DISTENTION: 0
EYE DISCHARGE: 0
VOMITING: 0
CHOKING: 0
BACK PAIN: 0

## 2021-06-24 ASSESSMENT — PAIN SCALES - GENERAL
PAINLEVEL_OUTOF10: 10
PAINLEVEL_OUTOF10: 7
PAINLEVEL_OUTOF10: 7
PAINLEVEL_OUTOF10: 0
PAINLEVEL_OUTOF10: 0
PAINLEVEL_OUTOF10: 10

## 2021-06-24 ASSESSMENT — PAIN DESCRIPTION - ONSET
ONSET: ON-GOING
ONSET: ON-GOING

## 2021-06-24 ASSESSMENT — PAIN DESCRIPTION - PAIN TYPE
TYPE: CHRONIC PAIN
TYPE: ACUTE PAIN
TYPE: ACUTE PAIN

## 2021-06-24 ASSESSMENT — PAIN DESCRIPTION - LOCATION
LOCATION: ABDOMEN
LOCATION: BACK
LOCATION: ABDOMEN

## 2021-06-24 ASSESSMENT — PAIN SCALES - WONG BAKER: WONGBAKER_NUMERICALRESPONSE: 0

## 2021-06-24 ASSESSMENT — PAIN DESCRIPTION - DESCRIPTORS
DESCRIPTORS: ACHING;CONSTANT
DESCRIPTORS: ACHING;DISCOMFORT

## 2021-06-24 ASSESSMENT — PAIN - FUNCTIONAL ASSESSMENT
PAIN_FUNCTIONAL_ASSESSMENT: PREVENTS OR INTERFERES SOME ACTIVE ACTIVITIES AND ADLS
PAIN_FUNCTIONAL_ASSESSMENT: ACTIVITIES ARE NOT PREVENTED

## 2021-06-24 ASSESSMENT — PAIN DESCRIPTION - FREQUENCY
FREQUENCY: CONTINUOUS
FREQUENCY: CONTINUOUS

## 2021-06-24 NOTE — CARE COORDINATION
INITIAL CASE MANAGEMENT ASSESSMENT    Reviewed chart, met with patient to assess possible discharge needs. Explained Case Management role/services. Living Situation: patient lives alone in an apartment with 2+6 KIESHA    ADLs: independent      DME: nebulizer    PT/OT Recs: not ordered at this time     Active Services: none     Transportation: has a license; friends assist with transport     Medications: takes on his own    PCP: ALISE ZARAGOZA CNP    PLAN/COMMENTS: Patient plans to return home at discharge and denied needs at present. SW/CM provided contact information for patient or family to call with any questions. SW/CM will follow and assist as needed.     Electronically signed by FRANK Galvez, RADHA, Case Management on 6/24/2021 at 3:48 PM  [place] [phone]

## 2021-06-24 NOTE — H&P
Hospital Medicine History & Physical      PCP: DENISA GALDAMEZ, ALISE - CNP    Date of Admission: 6/24/2021    Date of Service: Pt seen/examined, with 1st encounter, on 6/24/2021 and Admitted to Inpatient     Chief Complaint:  Abdominal pain      History Of Present Illness: The patient is a 73 y.o. male with h/o perforated bowel s/p colostomy and subsequent reversal 2017, who presents to [facility] with generalized abdominal pain x 1 week, and getting worse, now 10/10. In the ed he was found to have a sbo. ED workup  Vitals: tachypneic, otherwise wnl   Pertinent labs: wbc 15.1  Imaging: ct abdomen with dilated small bowel loops in the luq-paramidline region with subtle interloop bowel fluid seen, either due to a focal area of enteritis or early sbo.  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 Authorizing Provider   oxyCODONE-acetaminophen (PERCOCET) 5-325 MG per tablet Take 1 tablet by mouth every 4 hours as needed for Pain.  Taking 2 tablets every morning, then 1 tablet every 4 hours as needed   Yes Historical Provider, MD   carvedilol (COREG) 6.25 MG tablet Take 1 tablet by mouth 2 times daily (with meals) 1/28/21  Yes Saima Barnett MD   sacubitril-valsartan (ENTRESTO) 24-26 MG per tablet Take 1 tablet by mouth 2 times daily   Yes Historical Provider, MD   Umeclidinium Bromide (INCRUSE ELLIPTA) 62.5 MCG/INH AEPB Inhale 1 puff into the lungs daily 3/9/18  Yes ALISE Ragsdale - CNP   furosemide (LASIX) 40 MG tablet Take 1 tablet by mouth 2 times daily 7/5/17  Yes Astrid Tolentino MD   montelukast (SINGULAIR) 10 MG tablet Take 10 mg by mouth daily  10/27/16  Yes Historical Provider, MD   rivaroxaban (XARELTO) 20 MG TABS tablet Take 20 mg by mouth daily   Yes Historical Provider, MD   pravastatin (PRAVACHOL) 10 MG tablet Take 10 mg by mouth daily   Yes Historical Provider, MD   sitaGLIPtin (JANUVIA) 100 MG tablet Take 100 mg by mouth daily   Yes Historical Provider, MD       Allergies:  Patient has no known allergies. Social History:      TOBACCO:   reports that he has been smoking cigarettes. He has a 25.00 pack-year smoking history. He has never used smokeless tobacco.  ETOH:   reports current alcohol use. Family History:      History reviewed. No pertinent family history. REVIEW OF SYSTEMS:   Positive for abdominal pain and as noted in the HPI. All other systems reviewed and negative. PHYSICAL EXAM:    /74   Pulse 71   Temp 97.4 °F (36.3 °C) (Oral)   Resp 16   Ht 6' (1.829 m)   Wt 190 lb 0.6 oz (86.2 kg)   SpO2 94%   BMI 25.77 kg/m²     General appearance: No apparent distress, cooperative. HEENT Normal cephalic, atraumatic without obvious deformity. PERRL. EOM. Conjunctivae/corneas clear. ngt in place  Neck: Supple, No jugular venous distention/bruits. Trachea midline   Lungs: Clear to auscultation, bilaterally without Rales/Wheezes/Rhonchi without accessory muscle use.   Heart: Regular rate and rhythm with Normal S1/S2 without murmurs, rubs or gallops  Abdomen: Soft, non-tender or non-distended without rigidity or guarding and hypaoctive bowel sounds   Extremities: No clubbing, cyanosis, or edema bilaterally. Skin: Skin color, texture, turgor normal.  No rashes or lesions. Neurologic: Alert and oriented X 3, neurovascularly intact with sensory/motor intact upper extremities/lower extremities, bilaterally. Grossly non-focal.  Mental status: Alert, oriented, thought content appropriate. Peripheral Pulses: +3 Easily felt, not easily obliterated with pressure  Cap refill  +2 sec      CBC   Recent Labs     06/24/21 0424   WBC 15.1*   HGB 16.8   HCT 49.0         RENAL  Recent Labs     06/24/21 0424      K 4.1   CL 95*   CO2 27   BUN 19   CREATININE 1.2     LFT'S  Recent Labs     06/24/21 0424   AST 17   ALT 11   BILITOT 1.1*   ALKPHOS 47     COAG  No results for input(s): INR in the last 72 hours. CARDIAC ENZYMES  No results for input(s): CKTOTAL, CKMB, CKMBINDEX, TROPONINI in the last 72 hours.     U/A:    Lab Results   Component Value Date    COLORU YELLOW 06/24/2021    WBCUA 1 06/24/2021    RBCUA 3 06/24/2021    BACTERIA RARE 09/04/2016    CLARITYU Clear 06/24/2021    SPECGRAV >1.030 06/24/2021    LEUKOCYTESUR Negative 06/24/2021    BLOODU TRACE 06/24/2021    GLUCOSEU Negative 06/24/2021       ABG    Lab Results   Component Value Date    XYE0ZAA 26.0 03/08/2018    BEART 1.3 03/08/2018    H2DXXCLZ 96.5 03/08/2018    PHART 7.397 03/08/2018    VHU6QQE 43.1 03/08/2018    PO2ART 77.5 03/08/2018    YKA5BGB 27.3 03/08/2018           Active Hospital Problems    Diagnosis Date Noted    Partial small bowel obstruction ([facility] [address] [K56.600] 06/24/2021         PHYSICIANS CERTIFICATION:    I certify that Anna Caicedo is expected to be hospitalized for more than 2 midnights based on the following assessment and plan:      ASSESSMENT/PLAN:  Small bowel obstruction   - NG tube placed  - NPO  - Surgery consulted   - IVF, pain control, antiemetics    Chronic conditions - continue home meds unless otherwise stated  Copd  Afib, s/p ppm, on xarelto  chf - he is stable and euvolemic at this time, caution with ivf   htn  hld    DVT Prophylaxis: lovenox  Diet: Diet NPO Exceptions are: Sips of Water with Meds  Code Status: Full Code    Dispo - in pt       Sunita Sommer, DO    Thank you ALISE ZARAGOZA - RANDY for the opportunity to be involved in this patient's care. If you have any questions or concerns please feel free to contact me at [phone].

## 2021-06-24 NOTE — PLAN OF CARE
Problem: Pain:  Goal: Pain level will decrease  Description: Pain level will decrease  Outcome: Ongoing  Note: Pt assessed for pain. Pt denies any pain at this time. Will continue to monitor pt and assess for pain throughout rest of shift. Goal: Control of acute pain  Description: Control of acute pain  Outcome: Ongoing  Note: Pt assessed for pain. Pt denies any pain at this time. Will continue to monitor pt and assess for pain throughout rest of shift. Goal: Control of chronic pain  Description: Control of chronic pain  Outcome: Ongoing  Note: Pt assessed for pain. Pt denies any pain at this time. Will continue to monitor pt and assess for pain throughout rest of shift.

## 2021-06-24 NOTE — ACP (ADVANCE CARE PLANNING)
Advance Care Planning     Advance Care Planning Activator (Inpatient)  Conversation Note      Date of ACP Conversation: 6/24/2021     Conversation Conducted with: Patient with Decision Making Capacity    ACP Activator: [facility] Decision Maker:     Current Designated Health Care Decision Maker:     Primary Decision Maker: Juma Zuleta - [phone]    Secondary Decision Maker: Balwinder Jara Anna Maloney - [phone]      Care Preferences    Ventilation: \"If you were in your present state of health and suddenly became very ill and were unable to breathe on your own, what would your preference be about the use of a ventilator (breathing machine) if it were available to you? \"      Would the patient desire the use of ventilator (breathing machine)?: yes    \"If your health worsens and it becomes clear that your chance of recovery is unlikely, what would your preference be about the use of a ventilator (breathing machine) if it were available to you? \"     Would the patient desire the use of ventilator (breathing machine)?: No      Resuscitation  \"CPR works best to restart the heart when there is a sudden event, like a heart attack, in someone who is otherwise healthy. Unfortunately, CPR does not typically restart the heart for people who have serious health conditions or who are very sick. \"    \"In the event your heart stopped as a result of an underlying serious health condition, would you want attempts to be made to restart your heart (answer \"yes\" for attempt to resuscitate) or would you prefer a natural death (answer \"no\" for do not attempt to resuscitate)? \" yes       [] Yes   [] No   Educated Patient / Kathrine Mondragon regarding differences between Advance Directives and portable DNR orders.     Length of ACP Conversation in minutes:      Conversation Outcomes:  [x] ACP discussion completed  [] Existing advance directive reviewed with patient; no changes to patient's previously recorded wishes  [] New Advance Directive completed  [] Portable Do Not Rescitate prepared for Provider review and signature  [] POLST/POST/MOLST/MOST prepared for Provider review and signature      Follow-up plan:    [] Schedule follow-up conversation to continue planning  [] Referred individual to Provider for additional questions/concerns   [] Advised patient/agent/surrogate to review completed ACP document and update if needed with changes in condition, patient preferences or care setting    [x] This note routed to one or more involved healthcare providers    Electronically signed by FRANK Lutz, LOUIEW, Case Management on 6/24/2021 at 3:51 PM  [facility] [phone]

## 2021-06-24 NOTE — ED NOTES
Bed: [facility]  Expected date: 6/24/21  Expected time: 4:00 AM  Means of arrival: [facility]  Comments:  Abdominal pain     James Hayes RN  06/24/21 9905

## 2021-06-24 NOTE — PROGRESS NOTES
4 Eyes Admission Assessment     I agree as the admission nurse that 2 RN's have performed a thorough Head to Toe Skin Assessment on the patient. ALL assessment sites listed below have been assessed on admission. Areas assessed by both nurses: Arleyynda Party and niecy  [x]   Head, Face, and Ears   [x]   Shoulders, Back, and Chest  [x]   Arms, Elbows, and Hands   [x]   Coccyx, Sacrum, and Ischum  [x]   Legs, Feet, and Heels        Does the Patient have Skin Breakdown?   No         Brandin Prevention initiated: NO   Wound Care Orders initiated: NO      [employer] nurse consulted for Pressure Injury (Stage 3,4, Unstageable, DTI, NWPT, and Complex wounds):  NO    Nurse 1 eSignature: Electronically signed by Liz Royal RN on 6/24/21 at 5:16 PM EDT    **SHARE this note so that the co-signing nurse is able to place an eSignature**    Nurse 2 eSignature: Electronically signed by Adriana Dye RN on 6/24/21 at 6:52 PM EDT

## 2021-06-24 NOTE — PROGRESS NOTES
Patient resting in bed and is A&O X4. Assessment completed and medication administered. See MAR. Patients NG tube connected to suction. IV fluids infusing. Patient NPO at this time. Patient denies any additional requests at this time. Fall precautions in place, call light within reach, and bedside table nearby Will continue to monitor.      Electronically signed by Vivienne Kendall RN on 6/24/2021 at 12:58 PM

## 2021-06-24 NOTE — CONSULTS
includes colostomy; pacemaker placement; Appendectomy; Colonoscopy; Abdomen surgery (04/17/2017); back surgery; and hernia repair (11/15/2017). Medications  Prior to Admission medications    Medication Sig Start Date End Date Taking? Authorizing Provider   oxyCODONE-acetaminophen (PERCOCET) 5-325 MG per tablet Take 1 tablet by mouth every 4 hours as needed for Pain. Taking 2 tablets every morning, then 1 tablet every 4 hours as needed   Yes Historical Provider, MD   carvedilol (COREG) 6.25 MG tablet Take 1 tablet by mouth 2 times daily (with meals) 1/28/21  Yes Afia Breaux MD   sacubitril-valsartan (ENTRESTO) 24-26 MG per tablet Take 1 tablet by mouth 2 times daily   Yes Historical Provider, MD   Umeclidinium Bromide (INCRUSE ELLIPTA) 62.5 MCG/INH AEPB Inhale 1 puff into the lungs daily 3/9/18  Yes ALISE Holland - CNP   furosemide (LASIX) 40 MG tablet Take 1 tablet by mouth 2 times daily 7/5/17  Yes Ari Lopez MD   montelukast (SINGULAIR) 10 MG tablet Take 10 mg by mouth daily  10/27/16  Yes Historical Provider, MD   rivaroxaban (XARELTO) 20 MG TABS tablet Take 20 mg by mouth daily   Yes Historical Provider, MD   pravastatin (PRAVACHOL) 10 MG tablet Take 10 mg by mouth daily   Yes Historical Provider, MD   sitaGLIPtin (JANUVIA) 100 MG tablet Take 100 mg by mouth daily   Yes Historical Provider, MD    Scheduled Meds:   tiotropium  2 puff Inhalation Daily    sacubitril-valsartan  1 tablet Oral BID    rivaroxaban  20 mg Oral Daily with breakfast    pravastatin  10 mg Oral Nightly    montelukast  10 mg Oral Daily    carvedilol  6.25 mg Oral BID WC    sodium chloride flush  10 mL Intravenous 2 times per day     Continuous Infusions:   sodium chloride      sodium chloride 50 mL/hr at 06/24/21 1049     PRN Meds:.albuterol, sodium chloride flush, sodium chloride, ondansetron, polyethylene glycol, acetaminophen **OR** acetaminophen, morphine **OR** morphine  Allergies  has No Known Allergies.   Family History  Reviewedfamily history is not on file. Social History   reports that he has been smoking cigarettes. He has a 25.00 pack-year smoking history. He has never used smokeless tobacco. He reports current alcohol use. He reports that he does not use drugs. EDUCATION  Patient educated about their illness/diagnosis, stated above, and all questions answered. We discussed the importance of nutrition, medications they are taking, and healthy lifestyle. Review of Systems:  General Denies any fever or chills  HEENT Denies any diplopia, tinnitus or vertigo  Resp Denies any shortness of breath, cough or wheezing  Cardiac Denies any chest pain, palpitations, claudication or edema  GI Denies any melena, hematochezia, hematemesis or pyrosis   Denies any frequency, urgency, hesitancy or incontinence  Heme Denies bruising or bleeding easily  Neuro Denies any focal motor or sensory deficits  OBJECTIVE:   VITALS:  height is 6' (1.829 m) and weight is 190 lb 0.6 oz (86.2 kg). His oral temperature is 97.4 °F (36.3 °C). His blood pressure is 115/74 and his pulse is 71. His respiration is 16 and oxygen saturation is 92%. CONSTITUTIONAL: Alert and oriented times 3, no acute distress and cooperative to examination with proper mood and affect. SKIN: Skin color, texture, turgor normal. No rashes or lesions. LYMPH: no cervical nodes, no inguinal nodes  HEENT: Head is normocephalic, atraumatic. EOMI, PERRLA. NECK: Supple, symmetrical, trachea midline, no adenopathy, thyroid symmetric, not enlarged and no tenderness, skin normal.  CHEST/LUNGS: chest symmetric with normal A/P diameter, normal respiratory rate and rhythm  CARDIOVASCULAR: Heart sounds are normal.  Regular rate and rhythm   ABDOMEN: Distended Tenderness: periumbilical region. No peritonitis   RECTAL: deferred, not clinically indicated  NEUROLOGIC: There are no focalizing motor or sensory deficits. CN II-XII are grossly intact. Kathrene Andrade    EXTREMITIES: no cyanosis, no clubbing and no edema. LABS:     Recent Labs     06/24/21 0424 06/24/21 0629   WBC 15.1*  --    HGB 16.8  --    HCT 49.0  --      --      --    K 4.1  --    CL 95*  --    CO2 27  --    BUN 19  --    CREATININE 1.2  --    CALCIUM 9.7  --    AST 17  --    ALT 11  --    BILITOT 1.1*  --    NITRU  --  Negative   COLORU  --  YELLOW     Recent Labs     06/24/21 0424   ALKPHOS 47   ALT 11   AST 17   BILITOT 1.1*   LABALBU 4.8   LIPASE 43.0     CBC:   Lab Results   Component Value Date    WBC 15.1 06/24/2021    RBC 5.19 06/24/2021    HGB 16.8 06/24/2021    HCT 49.0 06/24/2021    MCV 94.4 06/24/2021    MCH 32.3 06/24/2021    MCHC 34.2 06/24/2021    RDW 13.6 06/24/2021     06/24/2021    MPV 8.3 06/24/2021     CMP:    Lab Results   Component Value Date     06/24/2021    K 4.1 06/24/2021    CL 95 06/24/2021    CO2 27 06/24/2021    BUN 19 06/24/2021    CREATININE 1.2 06/24/2021    GFRAA >60 06/24/2021    GFRAA >60 04/26/2011    AGRATIO 1.8 06/24/2021    LABGLOM >60 06/24/2021    GLUCOSE 189 06/24/2021    PROT 7.5 06/24/2021    PROT 7.6 04/26/2011    LABALBU 4.8 06/24/2021    CALCIUM 9.7 06/24/2021    BILITOT 1.1 06/24/2021    ALKPHOS 47 06/24/2021    AST 17 06/24/2021    ALT 11 06/24/2021     Urine Culture:  No components found for: CURINE  Blood Culture:  No components found for: CBLOOD, CFUNGUSBL  RADIOLOGY:     CT scan abd/pelvis:   Dilated small bowel loops in the left upper quadrant-paramidline region with   subtle interloop bowel fluid seen, either due to a focal area of enteritis or   early small bowel obstruction.  There is subtle swirling of the mesentery   seen on coronal images, raising the question of internal hernia. Thank you for the interesting evaluation. Further recommendations to follow. Jasson Tamez  General and Vascular Surgery [phone]  Electronically signed by KIMBERLYN Ruiz on 6/24/2021 at 11:26 AM    Attending      As per note above by Stephanie Adorno was personally seen and examined by me today  Chart, labs and imaging reviewed    A/P  Partial SBO vs enteritis   NG placed in ER, reports severe irritation and is essentially demanding we remove it   Reports flatus and improved pain since admission   NG removed by me today   Will monitor   Ice chips for now, advance in AM if stable   Repeat imaging if symptoms persist    Electronically signed by Colt Gonzalez MD on 6/24/2021 at 4:32 PM

## 2021-06-24 NOTE — ED PROVIDER NOTES
[facility]      Pt Name: Michelet Paul  MRN: [id number]  [place] 1950  Date of evaluation: 6/24/2021  Provider: Shayy Duran MD    CHIEF COMPLAINT       Chief Complaint   Patient presents with    Abdominal Pain       HISTORY OF PRESENT ILLNESS    Michelet Paul is a 73 y.o. male who presents to the emergency department with abdominal pain. Patient endorses abdominal pain. Has been going on the last week. Endorses 10 out of 10 abdominal pain currently. Generalized. Described as dull. Positive for nausea no vomiting. Positive for flatus. Has not had good bowel movements. Has not taken anything for pain. States this feels similar to the past when he had a perforated bowel. No other associated symptoms. Nursing Notes were reviewed. Including nursing noted for FM, Surgical History, Past Medical History, Social History, vitals, and allergies; agree with all. REVIEW OF SYSTEMS       Review of Systems   Constitutional: Negative for diaphoresis, fatigue, fever and unexpected weight change. HENT: Negative for congestion, dental problem, drooling, ear discharge and ear pain. Eyes: Negative for photophobia, pain, discharge, redness, itching and visual disturbance. Respiratory: Negative for apnea, cough, choking, chest tightness, shortness of breath, wheezing and stridor. Cardiovascular: Negative for chest pain, palpitations and leg swelling. Gastrointestinal: Positive for abdominal pain and nausea. Negative for abdominal distention, anal bleeding, blood in stool, constipation, diarrhea, rectal pain and vomiting. Endocrine: Negative for cold intolerance and heat intolerance. Genitourinary: Negative for decreased urine volume and urgency. Musculoskeletal: Negative for arthralgias and back pain. Skin: Negative for color change and pallor. Neurological: Negative for dizziness and facial asymmetry. Hematological: Negative for adenopathy. Does not bruise/bleed easily. Psychiatric/Behavioral: Negative for agitation, behavioral problems, confusion and decreased concentration. Except as noted above the remainder of the review of systems was reviewed and negative. 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    also 7/4/17    Pacemaker     defibrillator       SURGICAL HISTORY       Past Surgical History:   Procedure Laterality Date    ABDOMEN SURGERY  04/17/2017     BRYANT take down colostomy 2 hr 43 min    APPENDECTOMY      BACK SURGERY      COLONOSCOPY      COLOSTOMY      HERNIA REPAIR  11/15/2017    REPAIR C 15 X 10 CM [address]    PACEMAKER PLACEMENT         CURRENT MEDICATIONS       Previous Medications    ALBUTEROL (PROVENTIL) (5 MG/ML) 0.5% NEBULIZER SOLUTION    Take 1 mL by nebulization 4 times daily as needed for Wheezing    ALBUTEROL SULFATE HFA (PROAIR HFA) 108 (90 BASE) MCG/ACT INHALER    Inhale 2 puffs into the lungs every 6 hours as needed for Wheezing    AMIODARONE (CORDARONE) 200 MG TABLET    Take 1 tablet by mouth daily    CARVEDILOL (COREG) 6.25 MG TABLET    Take 1 tablet by mouth 2 times daily (with meals)    COMBIVENT RESPIMAT  MCG/ACT AERS INHALER    Inhale 1 puff into the lungs as needed for Wheezing or Shortness of Breath     FUROSEMIDE (LASIX) 40 MG TABLET    Take 1 tablet by mouth 2 times daily    MONTELUKAST (SINGULAIR) 10 MG TABLET    Take 10 mg by mouth daily     OXYCODONE-ACETAMINOPHEN (PERCOCET) 5-325 MG PER TABLET    Take 1-2 tablets by mouth every 4 hours as needed for Pain  Takes 2 tabs in am ,1 tab in afternoon and 1 tab at night.     PRAVASTATIN (PRAVACHOL) 10 MG TABLET Take 10 mg by mouth daily    RIVAROXABAN (XARELTO) 20 MG TABS TABLET    Take 20 mg by mouth daily    SACUBITRIL-VALSARTAN (ENTRESTO) 24-26 MG PER TABLET    Take 1 tablet by mouth 2 times daily    SITAGLIPTIN (JANUVIA) 100 MG TABLET    Take 100 mg by mouth daily    UMECLIDINIUM BROMIDE (INCRUSE ELLIPTA) 62.5 MCG/INH AEPB    Inhale 1 puff into the lungs daily       ALLERGIES     Patient has no known allergies. FAMILY HISTORY      History reviewed. No pertinent family history. SOCIAL HISTORY       Social History     Socioeconomic History    Marital status: Single     Spouse name: None    Number of children: None    Years of education: None    Highest education level: None   Occupational History    None   Tobacco Use    Smoking status: Current Every Day Smoker     Packs/day: 1.00     Years: 25.00     Pack years: 25.00     Types: Cigarettes    Smokeless tobacco: Never Used   Vaping Use    Vaping Use: Never used   Substance and Sexual Activity    Alcohol use: Yes     Comment: occ    Drug use: No    Sexual activity: None   Other Topics Concern    None   Social History Narrative    None     Social Determinants of Health     Financial Resource Strain:     Difficulty of Paying Living Expenses:    Food Insecurity:     Worried About Running Out of Food in the Last Year:     Ran Out of Food in the Last Year:    Transportation Needs:     Lack of Transportation (Medical):      Lack of Transportation (Non-Medical):    Physical Activity:     Days of Exercise per Week:     Minutes of Exercise per Session:    Stress:     Feeling of Stress :    Social Connections:     Frequency of Communication with Friends and Family:     Frequency of Social Gatherings with Friends and Family:     Attends Evangelical Services:     Active Member of Clubs or Organizations:     Attends Club or Organization Meetings:     Marital Status:    Intimate Partner Violence:     Fear of Current or Ex-Partner:     Emotionally Abused:     Physically Abused:     Sexually Abused:        PHYSICAL EXAM       ED Triage Vitals   BP Temp Temp Source Pulse Resp SpO2 Height Weight   06/24/21 0400 06/24/21 0400 06/24/21 0400 06/24/21 0400 06/24/21 0400 06/24/21 0415 06/24/21 0400 06/24/21 0400   113/88 97 °F (36.1 °C) Oral 70 30 100 % 6' (1.829 m) 190 lb 0.6 oz (86.2 kg)       Physical Exam  Vitals and nursing note reviewed. Constitutional:       General: He is not in acute distress. Appearance: He is well-developed. He is ill-appearing. He is not diaphoretic. HENT:      Head: Normocephalic and atraumatic. Eyes:      General:         Right eye: No discharge. Left eye: No discharge. Pupils: Pupils are equal, round, and reactive to light. Neck:      Thyroid: No thyromegaly. Trachea: No tracheal deviation. Cardiovascular:      Rate and Rhythm: Normal rate and regular rhythm. Heart sounds: No murmur heard. Pulmonary:      Breath sounds: No wheezing or rales. Chest:      Chest wall: No tenderness. Abdominal:      General: There is no distension. Palpations: Abdomen is soft. There is no mass. Tenderness: There is abdominal tenderness. There is no guarding or rebound. Musculoskeletal:         General: No tenderness or deformity. Normal range of motion. Cervical back: Normal range of motion. Skin:     General: Skin is warm. Coloration: Skin is not pale. Findings: No erythema or rash. Neurological:      Mental Status: He is alert. Cranial Nerves: No cranial nerve deficit. Motor: No abnormal muscle tone.       Coordination: Coordination normal.         DIAGNOSTIC RESULTS     EKG: All EKG's are interpreted by the Emergency Department Physician who either signs or Co-signs this chart in the absence of acardiologist.    None    RADIOLOGY:   Non-plain film images such as CT, Ultrasoundand MRI are read by the radiologist. Plain radiographic images are visualized and preliminarily interpreted by the emergency physician with the below findings:    Impression   Dilated small bowel loops in the left upper quadrant-paramidline region with   subtle interloop bowel fluid seen, either due to a focal area of enteritis or   early small bowel obstruction. Maribel Contes is subtle swirling of the mesentery   seen on coronal images, raising the question of internal hernia. ED BEDSIDE ULTRASOUND:   Performed by ED Physician - none    LABS:  Labs Reviewed   CBC WITH AUTO DIFFERENTIAL - Abnormal; Notable for the following components:       Result Value    WBC 15.1 (*)     Neutrophils Absolute 13.2 (*)     All other components within normal limits    Narrative:     Performed at:  [address] [address]   Phone [phone]   COMPREHENSIVE METABOLIC PANEL W/ REFLEX TO MG FOR LOW K   LIPASE   URINE RT REFLEX TO CULTURE       All other labs were withinnormal range or not returned as of this dictation. EMERGENCY DEPARTMENT COURSE and DIFFERENTIAL DIAGNOSIS/MDM:     PMH, Surgical Hx, FH, Social Hx reviewed by myself (ETOH usage, Tobacco usage, Drug usage reviewed by myself, no pertinent Hx)- No Pertinent Hx     Old records were reviewed by me    73 yr old male with abdominal pain found to have concern for partial SBO. General surgery Dr Jade Montalvo NG tube placement which I agree. Admission Hospitalist.     CRITICAL CARE TIME   Total Critical Caretime was 39 minutes, excluding separately reportable procedures. There was a high probability of clinically significant/life threatening deterioration in the patient's condition which required my urgent intervention.         PROCEDURES:  Unlessotherwise noted below, none    FINAL IMPRESSION      1. SBO (small bowel obstruction) ([address]          DISPOSITION/PLAN   DISPOSITION      Admission    (Please note that portions ofthis note were completed with a voice recognition program.  Efforts were made to edit the dictations but occasionally words are mis-transcribed.)    Elliot Mariano MD(electronically signed)  Attending Emergency Physician        Elliot Mariano MD  06/24/21 9545

## 2021-06-24 NOTE — PROGRESS NOTES
Pt arrived to floor from ER at 0800 via stretcher. Pt oriented to room, call light, policies and procedures, the menu and ordering. Call light within reach. Bed in lowest position, bed alarm on, and wheels locked. Pt verbalized understanding. No complaints, questions or concerns at this time.       Electronically signed by Rajan Murillo RN on 6/24/2021 at 8:36 AM

## 2021-06-24 NOTE — PROGRESS NOTES
Medication Reconciliation    List of medications patient is currently taking is complete. Source of information: 1. Conversation with patient                                      2. EPIC records      Allergies  Patient has no known allergies. Notes regarding home medications:   1. Amiodarone removed from list. This was stopped at last cardiology visit. 2. Lasix is prescribed BID, but patient only taking every other day due to increased urination. 3. Entresto prescribed BID, but patient only taking once daily.           Vick Souza, [facility], PharmD, 6/24/2021 9:06 AM

## 2021-06-24 NOTE — PROGRESS NOTES
Patient resting in bed and is A&O X4. Patient denies pain at this time. VSS. Fluids infusing in Right AC. Patient on Room air. Patient denies any additional requests at this time. Fall precautions in place, call light within reach, and bedside table nearby. Will continue to monitor.      Electronically signed by Laney Mercado RN on 6/24/2021 at 6:43 PM

## 2021-06-24 NOTE — RT PROTOCOL NOTE
RT Inhaler-Nebulizer Bronchodilator Protocol Note    There is a bronchodilator order in the chart from a provider indicating to follow the RT Bronchodilator Protocol and there is an Initiate RT Bronchodilator Protocol order as well (see protocol at bottom of note). The findings from the last RT Protocol Assessment were as follows:  Smoking: >15 Pack years  Surgical Status: No surgery  Xray: Clear  Respiratory Pattern: RR <12 or 21-30  Mental Status: Alert and Oriented  Breath Sounds: Scattered wheeze  Cough: Strong, productive  Activity Level: Walking unassisted  Oxygen Requirement: Room Air - 2LNC/28% or home setting  Indication for Bronchodilator Therapy: Decreased or absent breath sounds, On home bronchodilators  Bronchodilator Assessment Score: 6    Aerosolized bronchodilator medication orders have been revised according to the RT Bronchodilator Protocol. RT Inhaler-Nebulizer Bronchodilator Protocol:    Respiratory Therapist to perform RT Therapy Protocol Assessment then follow the protocol. No Indications - adjust the frequency to every 6 hours PRN wheezing or bronchospasm, if no treatments needed after 48 hours then discontinue using Per Protocol order mode. If indication present, adjust the RT bronchodilator orders based on the Bronchodilator Assessment Score as follows:    0-6 - enter or revise RT bronchodilator order to Albuterol Inhaler order with frequency of every 2 hours PRN for wheezing or increased work of breathing using Per Protocol order mode. If Albuterol Inhaler not tolerated or not effective, then discontinue the Albuterol Inhaler order and enter Albuterol Nebulizer order with same frequency and PRN reasons. Repeat RT Therapy Protocol Assessment as needed.     7-10 - discontinue any other Inpatient aerosolized bronchodilator medication orders and enter or revise two Albuterol Inhaler orders, one with BID frequency and one with frequency of every 2 hours PRN wheezing or increased work of breathing using Per Protocol order mode. Repeat RT Therapy Protocol Assessment with second treatment then BID and as needed. If Albuterol Inhaler not tolerated or not effective, then discontinue the Albuterol Inhaler orders and enter two Albuterol Nebulizer orders with same frequencies and PRN reasons. 11-13 - discontinue any other Inpatient aerosolized bronchodilator medication orders and enter DuoNeb Nebulizer orders QID frequency and an Albuterol Nebulizer order every 2 hours PRN wheezing or increased work of breathing using Per Protocol order mode. Repeat RT Therapy Protocol Assessment with second treatment then QID and as needed. Greater than 13 - discontinue any other Inpatient bronchodilator aerosolized medication orders and enter DuoNeb Nebulizer order every 4 hours frequency and Albuterol Nebulizer every 2 hours PRN wheezing or increased work of breathing using Per Protocol order mode. Repeat RT Therapy Protocol Assessment with second treatment then every 4 hours and as needed. RT to enter RT Home Evaluation for COPD & MDI Assessment order using Per Protocol order mode.     Electronically signed by Jose Salgado RCP on 6/24/2021 at 2:24 PM

## 2021-06-25 ENCOUNTER — APPOINTMENT (OUTPATIENT)
Dept: GENERAL RADIOLOGY | Age: 71
DRG: 389 | End: 2021-06-25
Payer: COMMERCIAL

## 2021-06-25 LAB
ANION GAP SERPL CALCULATED.3IONS-SCNC: 8 MMOL/L (ref 3–16)
BASOPHILS ABSOLUTE: 0 K/UL (ref 0–0.2)
BASOPHILS RELATIVE PERCENT: 0.5 %
BUN BLDV-MCNC: 17 MG/DL (ref 7–20)
CALCIUM SERPL-MCNC: 8.5 MG/DL (ref 8.3–10.6)
CHLORIDE BLD-SCNC: 104 MMOL/L (ref 99–110)
CO2: 27 MMOL/L (ref 21–32)
CREAT SERPL-MCNC: 0.9 MG/DL (ref 0.8–1.3)
EOSINOPHILS ABSOLUTE: 0.1 K/UL (ref 0–0.6)
EOSINOPHILS RELATIVE PERCENT: 2.1 %
GFR AFRICAN AMERICAN: >60
GFR NON-AFRICAN AMERICAN: >60
GLUCOSE BLD-MCNC: 103 MG/DL (ref 70–99)
HCT VFR BLD CALC: 41.2 % (ref 40.5–52.5)
HEMOGLOBIN: 14.2 G/DL (ref 13.5–17.5)
LYMPHOCYTES ABSOLUTE: 1.7 K/UL (ref 1–5.1)
LYMPHOCYTES RELATIVE PERCENT: 26.3 %
MCH RBC QN AUTO: 32.7 PG (ref 26–34)
MCHC RBC AUTO-ENTMCNC: 34.5 G/DL (ref 31–36)
MCV RBC AUTO: 94.8 FL (ref 80–100)
MONOCYTES ABSOLUTE: 0.6 K/UL (ref 0–1.3)
MONOCYTES RELATIVE PERCENT: 8.4 %
NEUTROPHILS ABSOLUTE: 4.1 K/UL (ref 1.7–7.7)
NEUTROPHILS RELATIVE PERCENT: 62.7 %
PDW BLD-RTO: 13.6 % (ref 12.4–15.4)
PLATELET # BLD: 192 K/UL (ref 135–450)
PMV BLD AUTO: 8.2 FL (ref 5–10.5)
POTASSIUM REFLEX MAGNESIUM: 4.1 MMOL/L (ref 3.5–5.1)
RBC # BLD: 4.35 M/UL (ref 4.2–5.9)
SODIUM BLD-SCNC: 139 MMOL/L (ref 136–145)
WBC # BLD: 6.6 K/UL (ref 4–11)

## 2021-06-25 PROCEDURE — 6370000000 HC RX 637 (ALT 250 FOR IP): Performed by: INTERNAL MEDICINE

## 2021-06-25 PROCEDURE — 2580000003 HC RX 258: Performed by: INTERNAL MEDICINE

## 2021-06-25 PROCEDURE — 36415 COLL VENOUS BLD VENIPUNCTURE: CPT

## 2021-06-25 PROCEDURE — 94761 N-INVAS EAR/PLS OXIMETRY MLT: CPT

## 2021-06-25 PROCEDURE — 99232 SBSQ HOSP IP/OBS MODERATE 35: CPT | Performed by: SURGERY

## 2021-06-25 PROCEDURE — 6360000002 HC RX W HCPCS: Performed by: FAMILY MEDICINE

## 2021-06-25 PROCEDURE — 1200000000 HC SEMI PRIVATE

## 2021-06-25 PROCEDURE — 80048 BASIC METABOLIC PNL TOTAL CA: CPT

## 2021-06-25 PROCEDURE — 94640 AIRWAY INHALATION TREATMENT: CPT

## 2021-06-25 PROCEDURE — 6370000000 HC RX 637 (ALT 250 FOR IP): Performed by: FAMILY MEDICINE

## 2021-06-25 PROCEDURE — 85025 COMPLETE CBC W/AUTO DIFF WBC: CPT

## 2021-06-25 PROCEDURE — 74019 RADEX ABDOMEN 2 VIEWS: CPT

## 2021-06-25 RX ORDER — OXYCODONE HYDROCHLORIDE AND ACETAMINOPHEN 5; 325 MG/1; MG/1
1 TABLET ORAL EVERY 4 HOURS PRN
Status: DISCONTINUED | OUTPATIENT
Start: 2021-06-25 | End: 2021-06-26 | Stop reason: HOSPADM

## 2021-06-25 RX ORDER — OXYCODONE HYDROCHLORIDE AND ACETAMINOPHEN 5; 325 MG/1; MG/1
2 TABLET ORAL EVERY 4 HOURS PRN
Status: DISCONTINUED | OUTPATIENT
Start: 2021-06-25 | End: 2021-06-26 | Stop reason: HOSPADM

## 2021-06-25 RX ORDER — MORPHINE SULFATE 2 MG/ML
1 INJECTION, SOLUTION INTRAMUSCULAR; INTRAVENOUS EVERY 6 HOURS PRN
Status: DISCONTINUED | OUTPATIENT
Start: 2021-06-25 | End: 2021-06-26 | Stop reason: HOSPADM

## 2021-06-25 RX ADMIN — Medication 10 ML: at 20:42

## 2021-06-25 RX ADMIN — OXYCODONE HYDROCHLORIDE AND ACETAMINOPHEN 2 TABLET: 5; 325 TABLET ORAL at 15:33

## 2021-06-25 RX ADMIN — SACUBITRIL AND VALSARTAN 1 TABLET: 24; 26 TABLET, FILM COATED ORAL at 20:41

## 2021-06-25 RX ADMIN — PRAVASTATIN SODIUM 10 MG: 10 TABLET ORAL at 20:41

## 2021-06-25 RX ADMIN — OXYCODONE HYDROCHLORIDE AND ACETAMINOPHEN 2 TABLET: 5; 325 TABLET ORAL at 11:25

## 2021-06-25 RX ADMIN — OXYCODONE HYDROCHLORIDE AND ACETAMINOPHEN 2 TABLET: 5; 325 TABLET ORAL at 20:41

## 2021-06-25 RX ADMIN — SACUBITRIL AND VALSARTAN 1 TABLET: 24; 26 TABLET, FILM COATED ORAL at 09:02

## 2021-06-25 RX ADMIN — MORPHINE SULFATE 1 MG: 2 INJECTION, SOLUTION INTRAMUSCULAR; INTRAVENOUS at 06:01

## 2021-06-25 RX ADMIN — RIVAROXABAN 20 MG: 20 TABLET, FILM COATED ORAL at 09:02

## 2021-06-25 RX ADMIN — MONTELUKAST 10 MG: 10 TABLET, FILM COATED ORAL at 09:01

## 2021-06-25 RX ADMIN — TIOTROPIUM BROMIDE INHALATION SPRAY 2 PUFF: 3.12 SPRAY, METERED RESPIRATORY (INHALATION) at 08:39

## 2021-06-25 RX ADMIN — CARVEDILOL 6.25 MG: 6.25 TABLET, FILM COATED ORAL at 16:45

## 2021-06-25 RX ADMIN — SODIUM CHLORIDE: 9 INJECTION, SOLUTION INTRAVENOUS at 04:28

## 2021-06-25 RX ADMIN — CARVEDILOL 6.25 MG: 6.25 TABLET, FILM COATED ORAL at 09:02

## 2021-06-25 RX ADMIN — Medication 10 ML: at 09:02

## 2021-06-25 ASSESSMENT — PAIN DESCRIPTION - DESCRIPTORS
DESCRIPTORS: ACHING;DISCOMFORT

## 2021-06-25 ASSESSMENT — PAIN DESCRIPTION - PROGRESSION
CLINICAL_PROGRESSION: GRADUALLY IMPROVING
CLINICAL_PROGRESSION: NOT CHANGED
CLINICAL_PROGRESSION: GRADUALLY IMPROVING
CLINICAL_PROGRESSION: GRADUALLY IMPROVING

## 2021-06-25 ASSESSMENT — PAIN DESCRIPTION - ORIENTATION
ORIENTATION: LOWER
ORIENTATION: MID
ORIENTATION: LOWER

## 2021-06-25 ASSESSMENT — PAIN DESCRIPTION - PAIN TYPE
TYPE: CHRONIC PAIN

## 2021-06-25 ASSESSMENT — PAIN DESCRIPTION - LOCATION
LOCATION: BACK

## 2021-06-25 ASSESSMENT — PAIN - FUNCTIONAL ASSESSMENT
PAIN_FUNCTIONAL_ASSESSMENT: ACTIVITIES ARE NOT PREVENTED
PAIN_FUNCTIONAL_ASSESSMENT: ACTIVITIES ARE NOT PREVENTED
PAIN_FUNCTIONAL_ASSESSMENT: PREVENTS OR INTERFERES SOME ACTIVE ACTIVITIES AND ADLS
PAIN_FUNCTIONAL_ASSESSMENT: ACTIVITIES ARE NOT PREVENTED

## 2021-06-25 ASSESSMENT — ENCOUNTER SYMPTOMS
NAUSEA: 0
VOMITING: 0

## 2021-06-25 ASSESSMENT — PAIN DESCRIPTION - FREQUENCY
FREQUENCY: CONTINUOUS
FREQUENCY: INTERMITTENT

## 2021-06-25 ASSESSMENT — PAIN SCALES - GENERAL
PAINLEVEL_OUTOF10: 8
PAINLEVEL_OUTOF10: 7
PAINLEVEL_OUTOF10: 6
PAINLEVEL_OUTOF10: 7
PAINLEVEL_OUTOF10: 0
PAINLEVEL_OUTOF10: 0
PAINLEVEL_OUTOF10: 5
PAINLEVEL_OUTOF10: 5
PAINLEVEL_OUTOF10: 0
PAINLEVEL_OUTOF10: 0

## 2021-06-25 ASSESSMENT — PAIN DESCRIPTION - ONSET
ONSET: GRADUAL
ONSET: ON-GOING
ONSET: GRADUAL
ONSET: GRADUAL

## 2021-06-25 ASSESSMENT — PAIN SCALES - WONG BAKER
WONGBAKER_NUMERICALRESPONSE: 8
WONGBAKER_NUMERICALRESPONSE: 0

## 2021-06-25 NOTE — PLAN OF CARE
Problem: Pain:  Goal: Pain level will decrease  Description: Pain level will decrease  6/24/2021 2355 by Donnie Limon RN  Outcome: Ongoing  Note: Patients pain level will decrease  6/24/2021 1029 by Erwin Landin RN  Outcome: Ongoing  Note: Pt assessed for pain. Pt denies any pain at this time. Will continue to monitor pt and assess for pain throughout rest of shift. Goal: Control of acute pain  Description: Control of acute pain  6/24/2021 2355 by Donnie Limon RN  Outcome: Ongoing  Note: Patient will have control of acute pain  6/24/2021 1029 by Erwin Landin RN  Outcome: Ongoing  Note: Pt assessed for pain. Pt denies any pain at this time. Will continue to monitor pt and assess for pain throughout rest of shift. Goal: Control of chronic pain  Description: Control of chronic pain  6/24/2021 2355 by Donnie Limon RN  Outcome: Ongoing  Note: Patient will have control of chronic pain  6/24/2021 1029 by Erwin Landin RN  Outcome: Ongoing  Note: Pt assessed for pain. Pt denies any pain at this time. Will continue to monitor pt and assess for pain throughout rest of shift.        Problem: OXYGENATION/RESPIRATORY FUNCTION  Goal: Patient will maintain patent airway  Outcome: Ongoing  Note: Patient will maintain a patent airway  Goal: Patient will achieve/maintain normal respiratory rate/effort  Description: Respiratory rate and effort will be within normal limits for the patient  Outcome: Ongoing  Note: Patient will maintain normal respiratory rate/effort     Problem: HEMODYNAMIC STATUS  Goal: Patient has stable vital signs and fluid balance  Outcome: Ongoing  Note: Patient will have stable vital signs     Problem: FLUID AND ELECTROLYTE IMBALANCE  Goal: Fluid and electrolyte balance are achieved/maintained  Outcome: Ongoing  Note: Patients fluid and electrolyte balance will be maintained     Problem: ACTIVITY INTOLERANCE/IMPAIRED MOBILITY  Goal: Mobility/activity is maintained at optimum level for patient  Outcome: Ongoing  Note: Patients mobility will be at optimum level

## 2021-06-25 NOTE — PROGRESS NOTES
Patient has been resting in bed with eyes closed this shift, no acute complaints of abd pain, only complained of back pain at beginning of shift. IV fluids infusing, abd remains soft, bowel sounds remain hypoactive, will continue to monitor.

## 2021-06-25 NOTE — PROGRESS NOTES
Patient resting in bed upon assessment, vital signs stable, complaining of pain in back and tenderness in abd, PRN given. Bowel sounds hypoactive, patient states he is passing gas. IV fluids infusing, all needs met at this time, will continue to monitor.

## 2021-06-25 NOTE — PROGRESS NOTES
Pt awake and oriented x4, sitting up in bed. He is tolerating his NPO diet well and also tolerating AM PO meds well. No complaints of N/V or pain at this time. Pt states that his abdominal pain is much better. His only complaint is back pain but that is chronic and occurs all of the time. IV site is clean, dry, intact. Call light within reach, able to make needs known. Fall precautions in place. Will continue to monitor.  Electronically signed by Foreign Vick on 6/25/2021 at 10:24 AM

## 2021-06-25 NOTE — PROGRESS NOTES
Hospitalist Progress Note    CC: <principal problem not specified>      Admit date: 6/24/2021  Days in hospital:  1    Subjective/interval history: Pt S/E. No new complaints. He is tolerating full liquids with a slight increase in abdominal discomfort after eating. Had a small bm and flatus. O2 status: room air    ROS:   Pertinent items are noted in HPI. Objective:    /71   Pulse 70   Temp 98.5 °F (36.9 °C) (Oral)   Resp 16   Ht 6' (1.829 m)   Wt 190 lb 11.2 oz (86.5 kg)   SpO2 96%   BMI 25.86 kg/m²     Gen: alert, NAD  HEENT: NC/AT, moist mucous membranes  Neck: supple, trachea midline  Heart: Normal s1/s2, RRR, no murmurs, gallops, or rubs. Lungs: clear bilaterally, no wheezing, no rales, no rhonchi, no use of accessory muscles  Abd: bowel sounds hypoactive, soft, mildly tender, mildly distended, no masses  Extrem: No clubbing, cyanosis, no edema  Skin: no rashes or lesions  Psych: A & O x3, affect appropriate  Neuro: grossly intact, moves all four extremities spontaneously.   Cap refill: +2 sec    Medications:  Scheduled Meds:   tiotropium  2 puff Inhalation Daily    sacubitril-valsartan  1 tablet Oral BID    rivaroxaban  20 mg Oral Daily with breakfast    pravastatin  10 mg Oral Nightly    montelukast  10 mg Oral Daily    carvedilol  6.25 mg Oral BID WC    sodium chloride flush  10 mL Intravenous 2 times per day       PRN Meds:  morphine **OR** [DISCONTINUED] morphine, oxyCODONE-acetaminophen **OR** oxyCODONE-acetaminophen, albuterol, sodium chloride flush, sodium chloride, ondansetron, polyethylene glycol, acetaminophen **OR** acetaminophen, phenol    IV:   sodium chloride      sodium chloride 50 mL/hr at 06/25/21 0428         Intake/Output Summary (Last 24 hours) at 6/25/2021 0923  Last data filed at 6/25/2021 0923  Gross per 24 hour   Intake 50 ml   Output 450 ml   Net -400 ml       Results:  CBC:   Recent Labs     06/24/21  0424 06/25/21  0536   WBC 15.1* 6.6 HGB 16.8 14.2   HCT 49.0 41.2   MCV 94.4 94.8    192     BMP:   Recent Labs     06/24/21  0424 06/25/21  0536    139   K 4.1 4.1   CL 95* 104   CO2 27 27   BUN 19 17   CREATININE 1.2 0.9     Mag: No results for input(s): MAG in the last 72 hours. Phos:   Lab Results   Component Value Date    PHOS 3.4 03/09/2018     No components found for: GLU    LIVER PROFILE:   Recent Labs     06/24/21  0424   AST 17   ALT 11   LIPASE 43.0   BILITOT 1.1*   ALKPHOS 47     PT/INR: No results for input(s): PROTIME, INR in the last 72 hours. APTT: No results for input(s): APTT in the last 72 hours. UA:  Recent Labs     06/24/21  0629   COLORU YELLOW   PHUR 5.0   WBCUA 1   RBCUA 3   CLARITYU Clear   SPECGRAV >1.030   LEUKOCYTESUR Negative   UROBILINOGEN 0.2   BILIRUBINUR Negative   BLOODU TRACE*   GLUCOSEU Negative       Invalid input(s): ABG  Lab Results   Component Value Date    CALCIUM 8.5 06/25/2021    PHOS 3.4 03/09/2018       Assessment:    Active Problems:    Partial small bowel obstruction (HCC)  Resolved Problems:    * No resolved hospital problems. [facility] course: a 73 y.o. male with h/o perforated bowel s/p colostomy and subsequent reversal 2017, who presents to [facility] with generalized abdominal pain x 1 week, and getting worse, now 10/10.  In the ed he was found to have a sbo.      ED workup  Vitals: tachypneic, otherwise wnl   Pertinent labs: wbc 15.1  Imaging: ct abdomen with dilated small bowel loops in the luq-paramidline region with subtle interloop bowel fluid seen, either due to a focal area of enteritis or early sbo.  There is subtle swirling of the mesentery, raising the question of internal hernia    Plan:  Small bowel obstruction   - NG tube placed  - full liquid diet per surgery  - Surgery consulted   - IVF, pain control, antiemetics     Chronic conditions - continue home meds unless otherwise stated  Copd  Afib, s/p ppm, on xarelto  Systolic, diastolic chf - he is stable and

## 2021-06-25 NOTE — PROGRESS NOTES
Patient complaining of chronic back pain this am, states he takes Percocet at home for this, 1mg Morphine given, bowel sounds hypoactive, abd soft, states no abd pain.

## 2021-06-25 NOTE — PLAN OF CARE
Problem: Pain:  Goal: Pain level will decrease  Description: Pain level will decrease  6/25/2021 1016 by Inder Bosch  Outcome: Ongoing  Note: Pt assessed for pain. Pt denies any pain at this time. Will continue to monitor pt and assess for pain throughout rest of shift. 6/24/2021 2355 by Sharifa Vargas RN  Outcome: Ongoing  Note: Patients pain level will decrease  Goal: Control of acute pain  Description: Control of acute pain  6/25/2021 1016 by Inder Bosch  Outcome: Ongoing  Note: Pt assessed for pain. Pt denies any pain at this time. Will continue to monitor pt and assess for pain throughout rest of shift. 6/24/2021 2355 by Sharifa Vragas RN  Outcome: Ongoing  Note: Patient will have control of acute pain  Goal: Control of chronic pain  Description: Control of chronic pain  6/25/2021 1016 by Inder Bosch  Outcome: Ongoing  Note: Pt assessed for pain. Pt denies any pain at this time. Will continue to monitor pt and assess for pain throughout rest of shift. 6/24/2021 2355 by Sharifa Vargas RN  Outcome: Ongoing  Note: Patient will have control of chronic pain     Problem: OXYGENATION/RESPIRATORY FUNCTION  Goal: Patient will maintain patent airway  6/25/2021 1016 by Inder Bosch  Outcome: Ongoing  Note: Patient respiratory function and O2 sat within normal limits. Will continue to monitor   6/24/2021 2355 by Sharifa Vargas RN  Outcome: Ongoing  Note: Patient will maintain a patent airway  Goal: Patient will achieve/maintain normal respiratory rate/effort  Description: Respiratory rate and effort will be within normal limits for the patient  6/25/2021 1016 by Inder Bosch  Outcome: Ongoing  Note: Patient respiratory rate/effort within normal limits. Unlabored. Will continue to monitor.    6/24/2021 2355 by Sharifa Vargas RN  Outcome: Ongoing  Note: Patient will maintain normal respiratory rate/effort     Problem: HEMODYNAMIC STATUS  Goal: Patient has stable vital signs and fluid balance  6/25/2021 1016 by Jorge Turner  Outcome: Ongoing  Note: Pt tolerating NPO well. Electrolyte balance maintained. Will encourage fluid restriction and electrolyte balance. 6/24/2021 2355 by Donnie Limon RN  Outcome: Ongoing  Note: Patient will have stable vital signs     Problem: FLUID AND ELECTROLYTE IMBALANCE  Goal: Fluid and electrolyte balance are achieved/maintained  6/25/2021 1016 by Jorge Turner  Outcome: Ongoing  Note: Pt tolerating NPO well. Electrolyte balance maintained. Will encourage fluid restriction and electrolyte balance. 6/24/2021 2355 by Donnie Limon RN  Outcome: Ongoing  Note: Patients fluid and electrolyte balance will be maintained     Problem: ACTIVITY INTOLERANCE/IMPAIRED MOBILITY  Goal: Mobility/activity is maintained at optimum level for patient  6/25/2021 1016 by Jorge Turner  Outcome: Ongoing  Note: Pt mobility is increasing. Pt is SBA but ambulates independently. Will monitor.    6/24/2021 2355 by Donnie Limon RN  Outcome: Ongoing  Note: Patients mobility will be at optimum level

## 2021-06-25 NOTE — PROGRESS NOTES
Took over this patient from [facility] at 1500. Pt is alert and oriented and denies needs at this time.  Electronically signed by Saloni Monroy RN on 6/25/2021 at 4:49 PM

## 2021-06-25 NOTE — PROGRESS NOTES
Pt alert and oriented x4 in bed. He tolerated his PM meds well. He still complains of some back pain. Gave pt PRN percocet at 1525. No complaints of any abdominal pain. No complaints of N/V at this time. Call light within reach, able to make needs known. Fall precautions in place. Will continue to monitor.  Electronically signed by Jigna Arteaga on 6/25/2021 at 6:46 PM

## 2021-06-25 NOTE — PROGRESS NOTES
Physician Progress Note      PATIENT:               Richmond Granados  CSN #:                  [id number]  :                       1950  ADMIT DATE:       2021 4:07 AM  DISCH DATE:  RESPONDING  PROVIDER #:        CED MORALEZ DO          QUERY TEXT:    Dear Dr. Aj Randall,  Pt admitted with SBO and has stable CHF documented. If possible, please   document in progress notes and discharge summary further specificity regarding   the type and acuity of CHF:    The medical record reflects the following:  Risk Factors: Hx HTN, HLD, A-fib, COPD  Clinical Indicators:  H and P notes \"chf - he is stable and euvolemic at this   time\"(21 Echo LVEF=35-40%, Grade II diastolic dysfunction)  Treatment: \"caution with ivf \" noted in H and P, Daily wts, I and O  Thank you,  Sindy Nj RN, CDS  [email or web address]  Options provided:  -- Chronic Systolic CHF/HFrEF  -- Chronic Diastolic CHF/HFpEF  -- Chronic Systolic and Diastolic CHF  -- Other - I will add my own diagnosis  -- Disagree - Not applicable / Not valid  -- Disagree - Clinically unable to determine / Unknown  -- Refer to Clinical Documentation Reviewer    PROVIDER RESPONSE TEXT:    This patient has chronic systolic and diastolic CHF. Query created by:  Marlen Jensen on 2021 10:46 AM      Electronically signed by:  CED MORALEZ DO 2021 10:55 AM

## 2021-06-25 NOTE — PROGRESS NOTES
Progress Note  Date:2021       Room:F4D-2174/3128-01  Patient Name:Mark Jorge     YOB: 1950     Age:71 y.o. Subjective    Subjective:  Symptoms:  Improved. Diet:  No nausea or vomiting. Activity level: Returning to normal.    Pain:  He complains of pain that is mild. Review of Systems   Gastrointestinal: Negative for nausea and vomiting. Objective         Vitals Last 24 Hours:  TEMPERATURE:  Temp  Av.2 °F (36.8 °C)  Min: 97.8 °F (36.6 °C)  Max: 98.5 °F (36.9 °C)  RESPIRATIONS RANGE: Resp  Avg: 15.5  Min: 15  Max: 16  PULSE OXIMETRY RANGE: SpO2  Av.3 %  Min: 94 %  Max: 96 %  PULSE RANGE: Pulse  Av.8  Min: 70  Max: 90  BLOOD PRESSURE RANGE: Systolic (03OLK), CNS:752 , Min:108 , QCZ:408   ; Diastolic (65RWO), MIHAI:69, Min:71, Max:77    I/O (24Hr): Intake/Output Summary (Last 24 hours) at 2021 1620  Last data filed at 2021 3808  Gross per 24 hour   Intake 50 ml   Output 450 ml   Net -400 ml     Objective  Labs/Imaging/Diagnostics    Labs:  CBC:  Recent Labs     21  0424 21  0536   WBC 15.1* 6.6   RBC 5.19 4.35   HGB 16.8 14.2   HCT 49.0 41.2   MCV 94.4 94.8   RDW 13.6 13.6    192     CHEMISTRIES:  Recent Labs     21  0424 21  0536    139   K 4.1 4.1   CL 95* 104   CO2 27 27   BUN 19 17   CREATININE 1.2 0.9   GLUCOSE 189* 103*     PT/INR:No results for input(s): PROTIME, INR in the last 72 hours. APTT:No results for input(s): APTT in the last 72 hours. LIVER PROFILE:  Recent Labs     21  0424   AST 17   ALT 11   BILITOT 1.1*   ALKPHOS 47       Imaging Last 24 Hours:  CT ABDOMEN PELVIS W IV CONTRAST Additional Contrast? None    Result Date: 2021  EXAMINATION: CT OF THE ABDOMEN AND PELVIS WITH CONTRAST 2021 5:14 am TECHNIQUE: CT of the abdomen and pelvis was performed with the administration of intravenous contrast. Multiplanar reformatted images are provided for review.  Dose modulation, iterative reconstruction, and/or weight based adjustment of the mA/kV was utilized to reduce the radiation dose to as low as reasonably achievable. COMPARISON: 04/12/2021 HISTORY: ORDERING SYSTEM PROVIDED HISTORY: abdominal pain TECHNOLOGIST PROVIDED HISTORY: Reason for exam:->abdominal pain Additional Contrast?->None Decision Support Exception - unselect if not a suspected or confirmed emergency medical condition->Emergency Medical Condition (MA) Reason for Exam: abdominal pain FINDINGS: Lower Chest: Streak artifact is seen from pacer. No pericardial effusion is seen. Small hiatal hernia seen. There is nonspecific thickening at the GE junction. No focal consolidation is seen at the lung bases. No pleural effusions. Bandlike opacity seen in the right lower lobe. Organs: Calcified granuloma seen within the spleen. No perisplenic fluid. Adrenal glands appear normal No hydronephrosis on the right. No hydronephrosis on the left Circumscribed hypodense nodules are seen in the right and left kidney measuring 2.8 cm in size or less, likely cysts No intrahepatic ductal dilatation. No perihepatic fluid. Focal fat seen along the falciform. No gallstones. Small calcification marginates the right hepatic lobe No peripancreatic fluid GI/Bowel: Dilated loops of small bowel are seen in the upper abdomen. There is scattered interloop bowel fluid seen. There is a small subtle swirling of the mesentery seen in the left upper quadrant, best appreciated on coronal reconstructions. Gas and stool is seen within the colon. Pelvis: Calcifications are seen the pelvis, compatible with phleboliths. No pelvic adenopathy. Anastomotic staple line seen in the region of the sigmoid Peritoneum/Retroperitoneum: Atherosclerotic change seen in abdominal aorta. Atherosclerotic change seen in the renal arteries. No retroperitoneal adenopathy. Bones/Soft Tissues: Spurring is seen in the spine. Remote rib deformities are seen on the left. Dilated small bowel loops in the left upper quadrant-paramidline region with subtle interloop bowel fluid seen, either due to a focal area of enteritis or early small bowel obstruction. There is subtle swirling of the mesentery seen on coronal images, raising the question of internal hernia. XR CHEST PORTABLE    Result Date: 6/24/2021  EXAMINATION: ONE XRAY VIEW OF THE CHEST 6/24/2021 7:08 am COMPARISON: 01/27/2021 radiograph HISTORY: ORDERING SYSTEM PROVIDED HISTORY: NG tube TECHNOLOGIST PROVIDED HISTORY: Reason for exam:->NG tube Reason for Exam: NG tube Acuity: Acute Type of Exam: Initial FINDINGS: Appropriate positioning of enteric tube with tip and side holes in the lumen of the stomach. ICD stable in the left chest.  Heart and mediastinum are normal.  No pulmonary vascular congestion. The lungs appear relatively clear. There are no significant skeletal findings. Appropriate positioning of enteric tube. XR ABDOMEN (2 VIEWS)    Result Date: 6/25/2021  EXAMINATION: TWO XRAY VIEWS OF THE ABDOMEN 6/25/2021 10:16 am COMPARISON: Recent CT HISTORY: ORDERING SYSTEM PROVIDED HISTORY: sbo TECHNOLOGIST PROVIDED HISTORY: Reason for exam:->sbo Reason for Exam: sbo Acuity: Acute Type of Exam: Initial FINDINGS: No focal consolidation seen at the lung bases. Pacer wires are seen. Moderate stool is seen in the colon. A few focal dilated small bowel loops are seen in the left mid abdomen, similar in degree compared to recent CT scan, measuring up to 3.7 cm. Degenerative changes are seen in the spine. Degenerative changes are seen in the hips. Focal dilated small bowel loops in the midline either due to partial obstruction or localized ileus.  Moderate stool in colon     Assessment//Plan           Hospital Problems         Last Modified POA    Partial small bowel obstruction ([address] 6/24/2021 Yes        Assessment & Plan     Partial SBO   Clinically improving   Less pain, passing flatus, small BM today   X ray with gas into colon but still with a few dilated small bowel loops   Full liquids for now, home in AM if stable    Electronically signed by Yessy Barbour MD on 6/25/2021 at 4:23 PM    Electronically signed by Yessy Barbour MD on 6/25/21 at 4:20 PM EDT

## 2021-06-26 VITALS
HEART RATE: 70 BPM | WEIGHT: 192.68 LBS | OXYGEN SATURATION: 96 % | TEMPERATURE: 98.6 F | HEIGHT: 72 IN | BODY MASS INDEX: 26.1 KG/M2 | SYSTOLIC BLOOD PRESSURE: 125 MMHG | DIASTOLIC BLOOD PRESSURE: 78 MMHG | RESPIRATION RATE: 16 BRPM

## 2021-06-26 LAB
ANION GAP SERPL CALCULATED.3IONS-SCNC: 10 MMOL/L (ref 3–16)
BASOPHILS ABSOLUTE: 0 K/UL (ref 0–0.2)
BASOPHILS RELATIVE PERCENT: 0.7 %
BUN BLDV-MCNC: 10 MG/DL (ref 7–20)
CALCIUM SERPL-MCNC: 8.7 MG/DL (ref 8.3–10.6)
CHLORIDE BLD-SCNC: 106 MMOL/L (ref 99–110)
CO2: 25 MMOL/L (ref 21–32)
CREAT SERPL-MCNC: 0.7 MG/DL (ref 0.8–1.3)
EOSINOPHILS ABSOLUTE: 0.2 K/UL (ref 0–0.6)
EOSINOPHILS RELATIVE PERCENT: 2.6 %
GFR AFRICAN AMERICAN: >60
GFR NON-AFRICAN AMERICAN: >60
GLUCOSE BLD-MCNC: 106 MG/DL (ref 70–99)
HCT VFR BLD CALC: 39.1 % (ref 40.5–52.5)
HEMOGLOBIN: 13.6 G/DL (ref 13.5–17.5)
LYMPHOCYTES ABSOLUTE: 2.1 K/UL (ref 1–5.1)
LYMPHOCYTES RELATIVE PERCENT: 34.8 %
MCH RBC QN AUTO: 32.8 PG (ref 26–34)
MCHC RBC AUTO-ENTMCNC: 34.7 G/DL (ref 31–36)
MCV RBC AUTO: 94.6 FL (ref 80–100)
MONOCYTES ABSOLUTE: 0.5 K/UL (ref 0–1.3)
MONOCYTES RELATIVE PERCENT: 8.4 %
NEUTROPHILS ABSOLUTE: 3.3 K/UL (ref 1.7–7.7)
NEUTROPHILS RELATIVE PERCENT: 53.5 %
PDW BLD-RTO: 13.1 % (ref 12.4–15.4)
PLATELET # BLD: 186 K/UL (ref 135–450)
PMV BLD AUTO: 8.4 FL (ref 5–10.5)
POTASSIUM REFLEX MAGNESIUM: 4.1 MMOL/L (ref 3.5–5.1)
RBC # BLD: 4.13 M/UL (ref 4.2–5.9)
SODIUM BLD-SCNC: 141 MMOL/L (ref 136–145)
WBC # BLD: 6.2 K/UL (ref 4–11)

## 2021-06-26 PROCEDURE — 85025 COMPLETE CBC W/AUTO DIFF WBC: CPT

## 2021-06-26 PROCEDURE — 94761 N-INVAS EAR/PLS OXIMETRY MLT: CPT

## 2021-06-26 PROCEDURE — 80048 BASIC METABOLIC PNL TOTAL CA: CPT

## 2021-06-26 PROCEDURE — 2580000003 HC RX 258: Performed by: INTERNAL MEDICINE

## 2021-06-26 PROCEDURE — 6370000000 HC RX 637 (ALT 250 FOR IP): Performed by: INTERNAL MEDICINE

## 2021-06-26 PROCEDURE — 6370000000 HC RX 637 (ALT 250 FOR IP): Performed by: FAMILY MEDICINE

## 2021-06-26 PROCEDURE — 36415 COLL VENOUS BLD VENIPUNCTURE: CPT

## 2021-06-26 PROCEDURE — 94640 AIRWAY INHALATION TREATMENT: CPT

## 2021-06-26 PROCEDURE — 99232 SBSQ HOSP IP/OBS MODERATE 35: CPT | Performed by: SURGERY

## 2021-06-26 RX ORDER — POLYETHYLENE GLYCOL 3350 17 G/17G
17 POWDER, FOR SOLUTION ORAL DAILY PRN
Qty: 527 G | Refills: 1 | Status: SHIPPED | OUTPATIENT
Start: 2021-06-26 | End: 2021-07-26

## 2021-06-26 RX ADMIN — OXYCODONE HYDROCHLORIDE AND ACETAMINOPHEN 2 TABLET: 5; 325 TABLET ORAL at 05:12

## 2021-06-26 RX ADMIN — SACUBITRIL AND VALSARTAN 1 TABLET: 24; 26 TABLET, FILM COATED ORAL at 08:52

## 2021-06-26 RX ADMIN — Medication 10 ML: at 08:52

## 2021-06-26 RX ADMIN — OXYCODONE HYDROCHLORIDE AND ACETAMINOPHEN 2 TABLET: 5; 325 TABLET ORAL at 10:25

## 2021-06-26 RX ADMIN — MONTELUKAST 10 MG: 10 TABLET, FILM COATED ORAL at 08:52

## 2021-06-26 RX ADMIN — RIVAROXABAN 20 MG: 20 TABLET, FILM COATED ORAL at 08:52

## 2021-06-26 RX ADMIN — CARVEDILOL 6.25 MG: 6.25 TABLET, FILM COATED ORAL at 08:52

## 2021-06-26 RX ADMIN — TIOTROPIUM BROMIDE INHALATION SPRAY 2 PUFF: 3.12 SPRAY, METERED RESPIRATORY (INHALATION) at 08:40

## 2021-06-26 ASSESSMENT — PAIN DESCRIPTION - ONSET
ONSET: GRADUAL
ONSET: ON-GOING
ONSET: GRADUAL
ONSET: ON-GOING

## 2021-06-26 ASSESSMENT — PAIN DESCRIPTION - PAIN TYPE
TYPE: CHRONIC PAIN

## 2021-06-26 ASSESSMENT — PAIN DESCRIPTION - PROGRESSION
CLINICAL_PROGRESSION: GRADUALLY IMPROVING
CLINICAL_PROGRESSION: GRADUALLY IMPROVING
CLINICAL_PROGRESSION: NOT CHANGED
CLINICAL_PROGRESSION: NOT CHANGED

## 2021-06-26 ASSESSMENT — PAIN SCALES - GENERAL
PAINLEVEL_OUTOF10: 7
PAINLEVEL_OUTOF10: 5
PAINLEVEL_OUTOF10: 7
PAINLEVEL_OUTOF10: 5

## 2021-06-26 ASSESSMENT — PAIN - FUNCTIONAL ASSESSMENT
PAIN_FUNCTIONAL_ASSESSMENT: ACTIVITIES ARE NOT PREVENTED

## 2021-06-26 ASSESSMENT — PAIN DESCRIPTION - DESCRIPTORS
DESCRIPTORS: ACHING
DESCRIPTORS: ACHING
DESCRIPTORS: ACHING;DISCOMFORT
DESCRIPTORS: ACHING;DISCOMFORT

## 2021-06-26 ASSESSMENT — PAIN DESCRIPTION - LOCATION
LOCATION: BACK

## 2021-06-26 ASSESSMENT — ENCOUNTER SYMPTOMS
VOMITING: 0
NAUSEA: 0

## 2021-06-26 ASSESSMENT — PAIN DESCRIPTION - ORIENTATION
ORIENTATION: LOWER
ORIENTATION: LOWER

## 2021-06-26 ASSESSMENT — PAIN DESCRIPTION - FREQUENCY
FREQUENCY: INTERMITTENT
FREQUENCY: INTERMITTENT
FREQUENCY: CONTINUOUS
FREQUENCY: CONTINUOUS

## 2021-06-26 ASSESSMENT — PAIN SCALES - WONG BAKER
WONGBAKER_NUMERICALRESPONSE: 0
WONGBAKER_NUMERICALRESPONSE: 0

## 2021-06-26 NOTE — CARE COORDINATION
Discharge order noted. Chart reviewed. No additional discharge needs identified at this time.     Electronically signed by Ilia Gilbert RN MSN on 6/26/2021 at 1:51 PM

## 2021-06-26 NOTE — PROGRESS NOTES
Patient alert and oriented, discharged to home with documented belongings. IV removed with no complications. Transported out of [facility] by wheelchair. Reviewed discharge, follow up, and medication instructions with patient and patient verbalized understanding.  Electronically signed by Mike Garza RN on 6/26/2021 at 2:59 PM

## 2021-06-26 NOTE — PLAN OF CARE
Problem: Pain:  Goal: Pain level will decrease  Description: Pain level will decrease  6/25/2021 2011 by Manan Prabhakar RN  Note: Pt will report a decrease in pain this shift. Pain will be assessed frequently and treated per order. 6/25/2021 1016 by Mortimer Lora  Outcome: Ongoing  Note: Pt assessed for pain. Pt denies any pain at this time. Will continue to monitor pt and assess for pain throughout rest of shift. Goal: Control of acute pain  Description: Control of acute pain  6/25/2021 2011 by Manan Prabhakar RN  Note: Patient educated on acute pain. Taught patient to use call light to ask for pain medication. PRN pain medication given for acute pain. Will continue to monitor pain per unit protocol. 6/25/2021 1016 by Mortimer Lora  Outcome: Ongoing  Note: Pt assessed for pain. Pt denies any pain at this time. Will continue to monitor pt and assess for pain throughout rest of shift. Goal: Control of chronic pain  Description: Control of chronic pain  6/25/2021 1016 by Mortimer Lora  Outcome: Ongoing  Note: Pt assessed for pain. Pt denies any pain at this time. Will continue to monitor pt and assess for pain throughout rest of shift. Problem: OXYGENATION/RESPIRATORY FUNCTION  Goal: Patient will maintain patent airway  6/25/2021 2011 by Manan Prabhakar RN  Note: Patient will maintain a patent airway this shift. 6/25/2021 1016 by Mortimer Lora  Outcome: Ongoing  Note: Patient respiratory function and O2 sat within normal limits. Will continue to monitor   Goal: Patient will achieve/maintain normal respiratory rate/effort  Description: Respiratory rate and effort will be within normal limits for the patient  6/25/2021 2011 by Manan Prabhakar RN  Note: Pt will maintain absence of respiratory complications. Oxygen saturations >90% at all times with supplemental O2 as needed. Will assess respiratory status every shift and PRN. Encourage to cough and deep breath. Encourage HHN as ordered.

## 2021-06-26 NOTE — DISCHARGE SUMMARY
Hospital Medicine Discharge Summary    Patient: Frank Lester     Gender: male  : 1950   Age: 73 y.o. MRN: [id number]    Code Status: Full Code     Primary Care Provider: ALISE Hunt CNP    Admit Date: 2021   Discharge Date:   2021    Admitting Physician: Reina Scott MD  Discharge Physician: Elbert Cortes, DO     Discharge Diagnoses: Active Hospital Problems    Diagnosis Date Noted    Partial small bowel obstruction ([facility] [address] [F70.288] 2021       Hospital Course: a 81 y. o. male with h/o perforated bowel s/p colostomy and subsequent reversal , who presented to [facility] with generalized abdominal pain x 1 week. In the ed he was found to have a sbo.       ED workup  Vitals: tachypneic, otherwise wnl   Pertinent labs: wbc 15.1  Imaging: ct abdomen with dilated small bowel loops in the luq-paramidline region with subtle interloop bowel fluid seen, either due to a focal area of enteritis or early sbo.   There is subtle swirling of the mesentery, raising the question of internal hernia  Work up completed, and improved with treatment as below. was discharged today in stable condition. Small bowel obstruction   - improved with bowel rest and ivf, and has tolerated a full liquid diet per surgery  - Surgery consulted      Chronic conditions - continue home meds unless otherwise stated  Copd  Afib, s/p ppm, on xarelto  Systolic, diastolic chf - he is stable and euvolemic at this time, caution with ivf. Echo 2021 - lvef 35-40%, ddII  htn  hld    Disposition:  Home    Exam:     /78   Pulse 70   Temp 98.6 °F (37 °C) (Oral)   Resp 16   Ht 6' (1.829 m)   Wt 192 lb 10.9 oz (87.4 kg)   SpO2 96%   BMI 26.13 kg/m²     General appearance:  No apparent distress, appears stated age and cooperative. HEENT:  Normal cephalic, atraumatic without obvious deformity. Pupils equal, round, and reactive to light. Extra ocular muscles intact.  Conjunctivae/corneas clear.  Neck: Supple, with full range of motion. No jugular venous distention. Trachea midline. Respiratory:  Normal respiratory effort. Clear to auscultation, bilaterally without Rales/Wheezes/Rhonchi. Cardiovascular:  Regular rate and rhythm with normal S1/S2 without murmurs, rubs or gallops. Abdomen: Soft, non-tender, non-distended with normal bowel sounds. Musculoskeletal:  No clubbing, cyanosis or edema bilaterally. Full range of motion without deformity. Skin: Skin color, texture, turgor normal.  No rashes or lesions. Neurologic:  Neurovascularly intact without any focal sensory/motor deficits. Cranial nerves: II-XII intact, grossly non-focal.  Psychiatric:  Alert and oriented, thought content appropriate, normal insight    Consults:     IP CONSULT TO GENERAL SURGERY  IP CONSULT TO GENERAL SURGERY    Labs: For convenience and continuity at follow-up the following most recent labs are provided:    Lab Results   Component Value Date    WBC 6.2 06/26/2021    HGB 13.6 06/26/2021    HCT 39.1 06/26/2021    MCV 94.6 06/26/2021     06/26/2021     06/26/2021    K 4.1 06/26/2021     06/26/2021    CO2 25 06/26/2021    BUN 10 06/26/2021    CREATININE 0.7 06/26/2021    CALCIUM 8.7 06/26/2021    PHOS 3.4 03/09/2018    ALKPHOS 47 06/24/2021    ALT 11 06/24/2021    AST 17 06/24/2021    BILITOT 1.1 06/24/2021    BILIDIR <0.2 09/22/2016    LABALBU 4.8 06/24/2021    LDLCALC 90 07/19/2012    TRIG 102 09/22/2016     Lab Results   Component Value Date    INR 0.97 04/17/2017    INR 1.4 09/28/2016    INR 1.5 09/27/2016       Radiology:  XR ABDOMEN (2 VIEWS)   Final Result   Focal dilated small bowel loops in the midline either due to partial   obstruction or localized ileus. Moderate stool in colon         XR CHEST PORTABLE   Final Result   Appropriate positioning of enteric tube.          CT ABDOMEN PELVIS W IV CONTRAST Additional Contrast? None   Final Result   Dilated small bowel loops in the left upper quadrant-paramidline region with   subtle interloop bowel fluid seen, either due to a focal area of enteritis or   early small bowel obstruction. There is subtle swirling of the mesentery   seen on coronal images, raising the question of internal hernia. Discharge Medications:   Current Discharge Medication List      START taking these medications    Details   polyethylene glycol (GLYCOLAX) 17 g packet Take 17 g by mouth daily as needed for Constipation  Qty: 527 g, Refills: 1           Current Discharge Medication List        Current Discharge Medication List      CONTINUE these medications which have NOT CHANGED    Details   oxyCODONE-acetaminophen (PERCOCET) 5-325 MG per tablet Take 1 tablet by mouth every 4 hours as needed for Pain. Taking 2 tablets every morning, then 1 tablet every 4 hours as needed      carvedilol (COREG) 6.25 MG tablet Take 1 tablet by mouth 2 times daily (with meals)  Qty: 60 tablet, Refills: 3      sacubitril-valsartan (ENTRESTO) 24-26 MG per tablet Take 1 tablet by mouth 2 times daily      Umeclidinium Bromide (INCRUSE ELLIPTA) 62.5 MCG/INH AEPB Inhale 1 puff into the lungs daily  Qty: 1 each, Refills: 0      furosemide (LASIX) 40 MG tablet Take 1 tablet by mouth 2 times daily  Qty: 60 tablet, Refills: 3      montelukast (SINGULAIR) 10 MG tablet Take 10 mg by mouth daily       rivaroxaban (XARELTO) 20 MG TABS tablet Take 20 mg by mouth daily      pravastatin (PRAVACHOL) 10 MG tablet Take 10 mg by mouth daily      sitaGLIPtin (JANUVIA) 100 MG tablet Take 100 mg by mouth daily           Current Discharge Medication List      STOP taking these medications       albuterol (PROVENTIL) (5 MG/ML) 0.5% nebulizer solution Comments:   Reason for Stopping:                Follow-up appointments:  one week    Provider Follow-up:    pcp    Condition at Discharge:  Stable    The patient was seen and examined on day of discharge and this discharge summary is in conjunction with any daily progress note from day of discharge. Time Spent on discharge is 45 minutes  in the examination, evaluation, counseling and review of medications and discharge plan. Signed:    Lucille Puente DO   6/26/2021      Thank you ALISE ZARAGOZA CNP for the opportunity to be involved in this patient's care. If you have any questions or concerns please feel free to contact me at [phone].

## 2021-06-26 NOTE — PROGRESS NOTES
Patient is alert & oriented x4, UAL, 2/4 bed rails up, bed in lowest position, fall precautions in place, call light within reach. Morning medications given without complications. Will cont to monitor and reassess.  Electronically signed by Sedonia Hamman, RN on 6/26/2021 at 9:04 AM

## 2021-06-26 NOTE — PROGRESS NOTES
Progress Note  Date:2021       Room:O4T-7762/3128-01  Patient Name:Mark Jorge     YOB: 1950     Age:71 y.o. Subjective    Subjective:  Symptoms:  Improved. Diet:  No nausea or vomiting. Activity level: Returning to normal.    Pain:  He complains of pain that is mild. Review of Systems   Gastrointestinal: Negative for nausea and vomiting. Objective         Vitals Last 24 Hours:  TEMPERATURE:  Temp  Av.1 °F (36.7 °C)  Min: 97.4 °F (36.3 °C)  Max: 98.6 °F (37 °C)  RESPIRATIONS RANGE: Resp  Av  Min: 16  Max: 16  PULSE OXIMETRY RANGE: SpO2  Av %  Min: 94 %  Max: 97 %  PULSE RANGE: Pulse  Av  Min: 70  Max: 70  BLOOD PRESSURE RANGE: Systolic (13LQH), JSU:044 , Min:111 , XFY:950   ; Diastolic (61ABR), EYE:25, Min:66, Max:78    I/O (24Hr): Intake/Output Summary (Last 24 hours) at 2021 1121  Last data filed at 2021 0914  Gross per 24 hour   Intake 700 ml   Output --   Net 700 ml     Objective  Labs/Imaging/Diagnostics    Labs:  CBC:  Recent Labs     21  0536 21  0507   WBC 15.1* 6.6 6.2   RBC 5.19 4.35 4.13*   HGB 16.8 14.2 13.6   HCT 49.0 41.2 39.1*   MCV 94.4 94.8 94.6   RDW 13.6 13.6 13.1    192 186     CHEMISTRIES:  Recent Labs     21  0536 21  0507    139 141   K 4.1 4.1 4.1   CL 95* 104 106   CO2 27 27 25   BUN 19 17 10   CREATININE 1.2 0.9 0.7*   GLUCOSE 189* 103* 106*     PT/INR:No results for input(s): PROTIME, INR in the last 72 hours. APTT:No results for input(s): APTT in the last 72 hours.   LIVER PROFILE:  Recent Labs     06/24/21  0424   AST 17   ALT 11   BILITOT 1.1*   ALKPHOS 47       Imaging Last 24 Hours:  XR ABDOMEN (2 VIEWS)    Result Date: 2021  EXAMINATION: TWO XRAY VIEWS OF THE ABDOMEN 2021 10:16 am COMPARISON: Recent CT HISTORY: ORDERING SYSTEM PROVIDED HISTORY: sbo TECHNOLOGIST PROVIDED HISTORY: Reason for exam:->sbo Reason for Exam: sbo Acuity: Acute Type of Exam: Initial FINDINGS: No focal consolidation seen at the lung bases. Pacer wires are seen. Moderate stool is seen in the colon. A few focal dilated small bowel loops are seen in the left mid abdomen, similar in degree compared to recent CT scan, measuring up to 3.7 cm. Degenerative changes are seen in the spine. Degenerative changes are seen in the hips. Focal dilated small bowel loops in the midline either due to partial obstruction or localized ileus.  Moderate stool in colon     Assessment//Plan           Hospital Problems         Last Modified POA    Partial small bowel obstruction ([address] 6/24/2021 Yes        Assessment & Plan    Partial SBO   Improving   Wants to eat   Will try low fiber diet today   If tolerating PO can discharge today from my standpoint   If not tolerating will need repeat imaging with PO contrast    Electronically signed by Lashon Velarde MD on 6/26/2021 at 11:21 AM    Electronically signed by Lashon Velarde MD on 6/26/21 at 11:21 AM EDT

## 2022-04-25 ENCOUNTER — TELEPHONE (OUTPATIENT)
Dept: CASE MANAGEMENT | Age: 72
End: 2022-04-25

## 2022-05-23 ENCOUNTER — TELEPHONE (OUTPATIENT)
Dept: CASE MANAGEMENT | Age: 72
End: 2022-05-23

## 2022-06-21 ENCOUNTER — TELEPHONE (OUTPATIENT)
Dept: CASE MANAGEMENT | Age: 72
End: 2022-06-21

## 2022-06-21 NOTE — TELEPHONE ENCOUNTER
Certified Lung Cancer Screening Reminder Recommendation letter mailed to patient, this is patients 3rd & final reminder letter. Patients ordering provider LAURA Landry notified via fax message, verification received. Most recent smoking history reviewed.

## 2022-09-08 ENCOUNTER — HOSPITAL ENCOUNTER (OUTPATIENT)
Dept: CT IMAGING | Age: 72
Discharge: HOME | End: 2022-09-08
Payer: COMMERCIAL

## 2022-09-08 DIAGNOSIS — F17.210 CIGARETTE SMOKER: ICD-10-CM

## 2022-09-08 DIAGNOSIS — Z87.891 PERSONAL HISTORY OF TOBACCO USE: ICD-10-CM

## 2022-09-08 DIAGNOSIS — F17.200 NICOTINE DEPENDENCE, UNCOMPLICATED, UNSPECIFIED NICOTINE PRODUCT TYPE: ICD-10-CM

## 2022-09-08 PROCEDURE — 71271 CT THORAX LUNG CANCER SCR C-: CPT

## 2022-09-13 ENCOUNTER — TELEPHONE (OUTPATIENT)
Dept: CASE MANAGEMENT | Age: 72
End: 2022-09-13

## 2022-09-13 NOTE — TELEPHONE ENCOUNTER
CT Lung Cancer Screening completed on 9/8/2022, ordering provider, LAURA Duncan, routed radiology results with recommendations & verification received. Patient mailed results letter to address on file in EMR. Smoking history reviewed.

## 2022-11-28 ENCOUNTER — HOSPITAL ENCOUNTER (OUTPATIENT)
Dept: ULTRASOUND IMAGING | Age: 72
Discharge: HOME | End: 2022-11-28
Payer: COMMERCIAL

## 2022-11-28 DIAGNOSIS — R80.9 PROTEINURIA, UNSPECIFIED TYPE: ICD-10-CM

## 2022-11-28 PROCEDURE — 76770 US EXAM ABDO BACK WALL COMP: CPT

## 2023-02-10 ENCOUNTER — TELEPHONE (OUTPATIENT)
Dept: CASE MANAGEMENT | Age: 73
End: 2023-02-10

## 2023-02-10 NOTE — TELEPHONE ENCOUNTER
First Lung Cancer Screening Recommendation Reminder Letter mailed to patient. Most recent smoking history reviewed and ALA smoking cessation [id number] [facility] [address] class information mailed to patient.

## 2023-05-07 ENCOUNTER — TELEPHONE (OUTPATIENT)
Dept: CASE MANAGEMENT | Age: 73
End: 2023-05-07

## 2023-05-28 ENCOUNTER — TELEPHONE (OUTPATIENT)
Dept: CASE MANAGEMENT | Age: 73
End: 2023-05-28

## 2023-07-05 ENCOUNTER — HOSPITAL ENCOUNTER (OUTPATIENT)
Dept: CT IMAGING | Age: 73
Discharge: HOME | End: 2023-07-05
Payer: COMMERCIAL

## 2023-07-05 DIAGNOSIS — R91.8 OTHER NONSPECIFIC ABNORMAL FINDING OF LUNG FIELD: ICD-10-CM

## 2023-07-05 PROCEDURE — 71250 CT THORAX DX C-: CPT

## 2023-11-07 ENCOUNTER — HOSPITAL ENCOUNTER (OUTPATIENT)
Dept: CT IMAGING | Age: 73
Discharge: HOME | End: 2023-11-07
Attending: UROLOGY
Payer: COMMERCIAL

## 2023-11-07 DIAGNOSIS — R31.29 MICROSCOPIC HEMATURIA: ICD-10-CM

## 2023-11-07 PROCEDURE — 74176 CT ABD & PELVIS W/O CONTRAST: CPT

## 2023-12-29 ENCOUNTER — HOSPITAL ENCOUNTER (INPATIENT)
Age: 73
LOS: 5 days | Discharge: HOME | DRG: 871 | End: 2024-01-03
Attending: STUDENT IN AN ORGANIZED HEALTH CARE EDUCATION/TRAINING PROGRAM | Admitting: FAMILY MEDICINE
Payer: COMMERCIAL

## 2023-12-29 ENCOUNTER — APPOINTMENT (OUTPATIENT)
Dept: GENERAL RADIOLOGY | Age: 73
DRG: 871 | End: 2023-12-29
Payer: COMMERCIAL

## 2023-12-29 DIAGNOSIS — J96.02 ACUTE RESPIRATORY FAILURE WITH HYPOXIA AND HYPERCAPNIA (HCC): Primary | ICD-10-CM

## 2023-12-29 DIAGNOSIS — A41.9 SEPTICEMIA (HCC): ICD-10-CM

## 2023-12-29 DIAGNOSIS — J96.01 ACUTE RESPIRATORY FAILURE WITH HYPOXIA AND HYPERCAPNIA (HCC): Primary | ICD-10-CM

## 2023-12-29 PROBLEM — J44.1 ACUTE EXACERBATION OF CHRONIC OBSTRUCTIVE PULMONARY DISEASE (COPD) (HCC): Status: ACTIVE | Noted: 2023-12-29

## 2023-12-29 PROBLEM — R65.10 SIRS (SYSTEMIC INFLAMMATORY RESPONSE SYNDROME) (HCC): Status: ACTIVE | Noted: 2023-12-29

## 2023-12-29 PROBLEM — I50.22 CHRONIC HFREF (HEART FAILURE WITH REDUCED EJECTION FRACTION) (HCC): Status: ACTIVE | Noted: 2023-12-29

## 2023-12-29 LAB
ALBUMIN SERPL-MCNC: 4.5 G/DL (ref 3.4–5)
ALBUMIN SERPL-MCNC: 4.9 G/DL (ref 3.4–5)
ALBUMIN/GLOB SERPL: 1.5 {RATIO} (ref 1.1–2.2)
ALBUMIN/GLOB SERPL: 2 {RATIO} (ref 1.1–2.2)
ALP SERPL-CCNC: 58 U/L (ref 40–129)
ALP SERPL-CCNC: 79 U/L (ref 40–129)
ALT SERPL-CCNC: 15 U/L (ref 10–40)
ALT SERPL-CCNC: 30 U/L (ref 10–40)
AMPHETAMINES UR QL SCN>1000 NG/ML: ABNORMAL
ANION GAP SERPL CALCULATED.3IONS-SCNC: 10 MMOL/L (ref 3–16)
ANION GAP SERPL CALCULATED.3IONS-SCNC: 19 MMOL/L (ref 3–16)
AST SERPL-CCNC: 26 U/L (ref 15–37)
AST SERPL-CCNC: 32 U/L (ref 15–37)
BACTERIA URNS QL MICRO: ABNORMAL /HPF
BARBITURATES UR QL SCN>200 NG/ML: ABNORMAL
BASE EXCESS BLDV CALC-SCNC: -1.6 MMOL/L
BASE EXCESS BLDV CALC-SCNC: -9.4 MMOL/L
BASE EXCESS BLDV CALC-SCNC: 3.6 MMOL/L
BASOPHILS # BLD: 0 K/UL (ref 0–0.2)
BASOPHILS # BLD: 0 K/UL (ref 0–0.2)
BASOPHILS NFR BLD: 0 %
BASOPHILS NFR BLD: 0.4 %
BENZODIAZ UR QL SCN>200 NG/ML: ABNORMAL
BILIRUB SERPL-MCNC: 0.9 MG/DL (ref 0–1)
BILIRUB SERPL-MCNC: 1.1 MG/DL (ref 0–1)
BILIRUB UR QL STRIP.AUTO: NEGATIVE
BUN SERPL-MCNC: 10 MG/DL (ref 7–20)
BUN SERPL-MCNC: 13 MG/DL (ref 7–20)
CALCIUM SERPL-MCNC: 9.2 MG/DL (ref 8.3–10.6)
CALCIUM SERPL-MCNC: 9.6 MG/DL (ref 8.3–10.6)
CANNABINOIDS UR QL SCN>50 NG/ML: ABNORMAL
CHLORIDE SERPL-SCNC: 100 MMOL/L (ref 99–110)
CHLORIDE SERPL-SCNC: 97 MMOL/L (ref 99–110)
CLARITY UR: ABNORMAL
CO2 BLDV-SCNC: 27 MMOL/L
CO2 BLDV-SCNC: 29 MMOL/L
CO2 BLDV-SCNC: 33 MMOL/L
CO2 SERPL-SCNC: 24 MMOL/L (ref 21–32)
CO2 SERPL-SCNC: 28 MMOL/L (ref 21–32)
COCAINE UR QL SCN: ABNORMAL
COHGB MFR BLDV: 1.2 %
COHGB MFR BLDV: 1.8 %
COHGB MFR BLDV: 2.2 %
COLOR UR: ABNORMAL
CREAT SERPL-MCNC: 0.9 MG/DL (ref 0.8–1.3)
CREAT SERPL-MCNC: 1 MG/DL (ref 0.8–1.3)
DEPRECATED RDW RBC AUTO: 13.6 % (ref 12.4–15.4)
DEPRECATED RDW RBC AUTO: 13.9 % (ref 12.4–15.4)
DRUG SCREEN COMMENT UR-IMP: ABNORMAL
EOSINOPHIL # BLD: 0 K/UL (ref 0–0.6)
EOSINOPHIL # BLD: 0.3 K/UL (ref 0–0.6)
EOSINOPHIL NFR BLD: 0 %
EOSINOPHIL NFR BLD: 2 %
EPI CELLS #/AREA URNS AUTO: 1 /HPF (ref 0–5)
EST. AVERAGE GLUCOSE BLD GHB EST-MCNC: 128.4 MG/DL
ETHANOLAMINE SERPL-MCNC: NORMAL MG/DL (ref 0–0.08)
FENTANYL SCREEN, URINE: ABNORMAL
FLUAV RNA UPPER RESP QL NAA+PROBE: NEGATIVE
FLUBV AG NPH QL: NEGATIVE
GFR SERPLBLD CREATININE-BSD FMLA CKD-EPI: >60 ML/MIN/{1.73_M2}
GFR SERPLBLD CREATININE-BSD FMLA CKD-EPI: >60 ML/MIN/{1.73_M2}
GLUCOSE BLD-MCNC: 171 MG/DL (ref 70–99)
GLUCOSE BLD-MCNC: 181 MG/DL (ref 70–99)
GLUCOSE BLD-MCNC: 196 MG/DL (ref 70–99)
GLUCOSE BLD-MCNC: 213 MG/DL (ref 70–99)
GLUCOSE BLD-MCNC: 232 MG/DL (ref 70–99)
GLUCOSE SERPL-MCNC: 183 MG/DL (ref 70–99)
GLUCOSE SERPL-MCNC: 187 MG/DL (ref 70–99)
GLUCOSE UR STRIP.AUTO-MCNC: NEGATIVE MG/DL
HBA1C MFR BLD: 6.1 %
HCO3 BLDV-SCNC: 24 MMOL/L (ref 23–29)
HCO3 BLDV-SCNC: 27 MMOL/L (ref 23–29)
HCO3 BLDV-SCNC: 31 MMOL/L (ref 23–29)
HCT VFR BLD AUTO: 47.4 % (ref 40.5–52.5)
HCT VFR BLD AUTO: 51.8 % (ref 40.5–52.5)
HGB BLD-MCNC: 15.9 G/DL (ref 13.5–17.5)
HGB BLD-MCNC: 17.2 G/DL (ref 13.5–17.5)
HGB UR QL STRIP.AUTO: ABNORMAL
HYALINE CASTS #/AREA URNS AUTO: 15 /LPF (ref 0–8)
KETONES UR STRIP.AUTO-MCNC: 15 MG/DL
LACTATE BLDV-SCNC: 1.9 MMOL/L (ref 0.4–1.9)
LACTATE BLDV-SCNC: 8.7 MMOL/L (ref 0.4–1.9)
LEUKOCYTE ESTERASE UR QL STRIP.AUTO: NEGATIVE
LYMPHOCYTES # BLD: 0.4 K/UL (ref 1–5.1)
LYMPHOCYTES # BLD: 3.1 K/UL (ref 1–5.1)
LYMPHOCYTES NFR BLD: 21 %
LYMPHOCYTES NFR BLD: 4.9 %
MCH RBC QN AUTO: 31.9 PG (ref 26–34)
MCH RBC QN AUTO: 32.3 PG (ref 26–34)
MCHC RBC AUTO-ENTMCNC: 33.2 G/DL (ref 31–36)
MCHC RBC AUTO-ENTMCNC: 33.5 G/DL (ref 31–36)
MCV RBC AUTO: 95.3 FL (ref 80–100)
MCV RBC AUTO: 97.3 FL (ref 80–100)
METHADONE UR QL SCN>300 NG/ML: ABNORMAL
METHGB MFR BLDV: 0.6 %
METHGB MFR BLDV: 0.6 %
METHGB MFR BLDV: 0.7 %
MONOCYTES # BLD: 0.2 K/UL (ref 0–1.3)
MONOCYTES # BLD: 1.4 K/UL (ref 0–1.3)
MONOCYTES NFR BLD: 1.9 %
MONOCYTES NFR BLD: 10 %
MUCUS: PRESENT
NEUTROPHILS # BLD: 7.4 K/UL (ref 1.7–7.7)
NEUTROPHILS # BLD: 9.2 K/UL (ref 1.7–7.7)
NEUTROPHILS NFR BLD: 66 %
NEUTROPHILS NFR BLD: 92.8 %
NITRITE UR QL STRIP.AUTO: NEGATIVE
NT-PROBNP SERPL-MCNC: 1438 PG/ML (ref 0–124)
O2 THERAPY: ABNORMAL
OPIATES UR QL SCN>300 NG/ML: ABNORMAL
OXYCODONE UR QL SCN: POSITIVE
PCO2 BLDV: 57.7 MMHG (ref 40–50)
PCO2 BLDV: 59.4 MMHG (ref 40–50)
PCO2 BLDV: 90.6 MMHG (ref 40–50)
PCP UR QL SCN>25 NG/ML: ABNORMAL
PERFORMED ON: ABNORMAL
PH BLDV: 7.04 [PH] (ref 7.35–7.45)
PH BLDV: 7.26 [PH] (ref 7.35–7.45)
PH BLDV: 7.34 [PH] (ref 7.35–7.45)
PH UR STRIP.AUTO: 5 [PH] (ref 5–8)
PH UR STRIP: 5 [PH]
PLATELET # BLD AUTO: 198 K/UL (ref 135–450)
PLATELET # BLD AUTO: 232 K/UL (ref 135–450)
PMV BLD AUTO: 8.3 FL (ref 5–10.5)
PMV BLD AUTO: 8.8 FL (ref 5–10.5)
PO2 BLDV: 34 MMHG
PO2 BLDV: <30 MMHG
PO2 BLDV: <30 MMHG
POTASSIUM SERPL-SCNC: 4.9 MMOL/L (ref 3.5–5.1)
POTASSIUM SERPL-SCNC: 4.9 MMOL/L (ref 3.5–5.1)
PROCALCITONIN SERPL IA-MCNC: 0.05 NG/ML (ref 0–0.15)
PROT SERPL-MCNC: 6.8 G/DL (ref 6.4–8.2)
PROT SERPL-MCNC: 8.1 G/DL (ref 6.4–8.2)
PROT UR STRIP.AUTO-MCNC: 100 MG/DL
RBC # BLD AUTO: 4.98 M/UL (ref 4.2–5.9)
RBC # BLD AUTO: 5.33 M/UL (ref 4.2–5.9)
RBC CLUMPS #/AREA URNS AUTO: 4 /HPF (ref 0–4)
RBC MORPH BLD: NORMAL
SAO2 % BLDV: 24 %
SAO2 % BLDV: 56 %
SAO2 % BLDV: 8 %
SARS-COV-2 RDRP RESP QL NAA+PROBE: NOT DETECTED
SLIDE REVIEW: ABNORMAL
SODIUM SERPL-SCNC: 138 MMOL/L (ref 136–145)
SODIUM SERPL-SCNC: 140 MMOL/L (ref 136–145)
SP GR UR STRIP.AUTO: 1.03 (ref 1–1.03)
TROPONIN, HIGH SENSITIVITY: 15 NG/L (ref 0–22)
TROPONIN, HIGH SENSITIVITY: 21 NG/L (ref 0–22)
UA COMPLETE W REFLEX CULTURE PNL UR: ABNORMAL
UA DIPSTICK W REFLEX MICRO PNL UR: YES
URN SPEC COLLECT METH UR: ABNORMAL
UROBILINOGEN UR STRIP-ACNC: 1 E.U./DL
VARIANT LYMPHS NFR BLD MANUAL: 1 % (ref 0–6)
WBC # BLD AUTO: 13.9 K/UL (ref 4–11)
WBC # BLD AUTO: 8 K/UL (ref 4–11)
WBC #/AREA URNS AUTO: 1 /HPF (ref 0–5)

## 2023-12-29 PROCEDURE — 80053 COMPREHEN METABOLIC PANEL: CPT

## 2023-12-29 PROCEDURE — 83036 HEMOGLOBIN GLYCOSYLATED A1C: CPT

## 2023-12-29 PROCEDURE — 36415 COLL VENOUS BLD VENIPUNCTURE: CPT

## 2023-12-29 PROCEDURE — 6370000000 HC RX 637 (ALT 250 FOR IP): Performed by: NURSE PRACTITIONER

## 2023-12-29 PROCEDURE — 83605 ASSAY OF LACTIC ACID: CPT

## 2023-12-29 PROCEDURE — 6370000000 HC RX 637 (ALT 250 FOR IP): Performed by: FAMILY MEDICINE

## 2023-12-29 PROCEDURE — 87040 BLOOD CULTURE FOR BACTERIA: CPT

## 2023-12-29 PROCEDURE — 82077 ASSAY SPEC XCP UR&BREATH IA: CPT

## 2023-12-29 PROCEDURE — 94761 N-INVAS EAR/PLS OXIMETRY MLT: CPT

## 2023-12-29 PROCEDURE — 85025 COMPLETE CBC W/AUTO DIFF WBC: CPT

## 2023-12-29 PROCEDURE — 71045 X-RAY EXAM CHEST 1 VIEW: CPT

## 2023-12-29 PROCEDURE — 80307 DRUG TEST PRSMV CHEM ANLYZR: CPT

## 2023-12-29 PROCEDURE — 99223 1ST HOSP IP/OBS HIGH 75: CPT | Performed by: INTERNAL MEDICINE

## 2023-12-29 PROCEDURE — 94660 CPAP INITIATION&MGMT: CPT

## 2023-12-29 PROCEDURE — 94640 AIRWAY INHALATION TREATMENT: CPT

## 2023-12-29 PROCEDURE — 2060000000 HC ICU INTERMEDIATE R&B

## 2023-12-29 PROCEDURE — 6360000002 HC RX W HCPCS: Performed by: STUDENT IN AN ORGANIZED HEALTH CARE EDUCATION/TRAINING PROGRAM

## 2023-12-29 PROCEDURE — 87635 SARS-COV-2 COVID-19 AMP PRB: CPT

## 2023-12-29 PROCEDURE — 96367 TX/PROPH/DG ADDL SEQ IV INF: CPT

## 2023-12-29 PROCEDURE — 6370000000 HC RX 637 (ALT 250 FOR IP): Performed by: STUDENT IN AN ORGANIZED HEALTH CARE EDUCATION/TRAINING PROGRAM

## 2023-12-29 PROCEDURE — 81001 URINALYSIS AUTO W/SCOPE: CPT

## 2023-12-29 PROCEDURE — 84145 PROCALCITONIN (PCT): CPT

## 2023-12-29 PROCEDURE — 6360000002 HC RX W HCPCS: Performed by: FAMILY MEDICINE

## 2023-12-29 PROCEDURE — 2580000003 HC RX 258

## 2023-12-29 PROCEDURE — 99285 EMERGENCY DEPT VISIT HI MDM: CPT

## 2023-12-29 PROCEDURE — 2700000000 HC OXYGEN THERAPY PER DAY

## 2023-12-29 PROCEDURE — 87804 INFLUENZA ASSAY W/OPTIC: CPT

## 2023-12-29 PROCEDURE — 96365 THER/PROPH/DIAG IV INF INIT: CPT

## 2023-12-29 PROCEDURE — 83880 ASSAY OF NATRIURETIC PEPTIDE: CPT

## 2023-12-29 PROCEDURE — 2580000003 HC RX 258: Performed by: FAMILY MEDICINE

## 2023-12-29 PROCEDURE — 2580000003 HC RX 258: Performed by: STUDENT IN AN ORGANIZED HEALTH CARE EDUCATION/TRAINING PROGRAM

## 2023-12-29 PROCEDURE — 82803 BLOOD GASES ANY COMBINATION: CPT

## 2023-12-29 PROCEDURE — 5A09357 ASSISTANCE WITH RESPIRATORY VENTILATION, LESS THAN 24 CONSECUTIVE HOURS, CONTINUOUS POSITIVE AIRWAY PRESSURE: ICD-10-PCS | Performed by: FAMILY MEDICINE

## 2023-12-29 PROCEDURE — 84484 ASSAY OF TROPONIN QUANT: CPT

## 2023-12-29 RX ORDER — ONDANSETRON 4 MG/1
4 TABLET, ORALLY DISINTEGRATING ORAL EVERY 8 HOURS PRN
Status: DISCONTINUED | OUTPATIENT
Start: 2023-12-29 | End: 2024-01-03 | Stop reason: HOSPADM

## 2023-12-29 RX ORDER — IPRATROPIUM BROMIDE AND ALBUTEROL SULFATE 2.5; .5 MG/3ML; MG/3ML
1 SOLUTION RESPIRATORY (INHALATION)
Status: DISCONTINUED | OUTPATIENT
Start: 2023-12-29 | End: 2023-12-31

## 2023-12-29 RX ORDER — INSULIN LISPRO 100 [IU]/ML
0-8 INJECTION, SOLUTION INTRAVENOUS; SUBCUTANEOUS EVERY 6 HOURS
Status: DISCONTINUED | OUTPATIENT
Start: 2023-12-29 | End: 2023-12-29

## 2023-12-29 RX ORDER — ONDANSETRON 2 MG/ML
4 INJECTION INTRAMUSCULAR; INTRAVENOUS EVERY 6 HOURS PRN
Status: DISCONTINUED | OUTPATIENT
Start: 2023-12-29 | End: 2024-01-03 | Stop reason: HOSPADM

## 2023-12-29 RX ORDER — POLYETHYLENE GLYCOL 3350 17 G
2 POWDER IN PACKET (EA) ORAL
Status: DISCONTINUED | OUTPATIENT
Start: 2023-12-29 | End: 2024-01-03 | Stop reason: HOSPADM

## 2023-12-29 RX ORDER — ALBUTEROL SULFATE 2.5 MG/3ML
5 SOLUTION RESPIRATORY (INHALATION) ONCE
Status: COMPLETED | OUTPATIENT
Start: 2023-12-29 | End: 2023-12-29

## 2023-12-29 RX ORDER — DEXTROSE MONOHYDRATE 100 MG/ML
INJECTION, SOLUTION INTRAVENOUS CONTINUOUS PRN
Status: DISCONTINUED | OUTPATIENT
Start: 2023-12-29 | End: 2024-01-03 | Stop reason: HOSPADM

## 2023-12-29 RX ORDER — ACETAMINOPHEN 650 MG/1
650 SUPPOSITORY RECTAL EVERY 6 HOURS PRN
Status: DISCONTINUED | OUTPATIENT
Start: 2023-12-29 | End: 2024-01-03 | Stop reason: HOSPADM

## 2023-12-29 RX ORDER — FEXOFENADINE HCL 180 MG/1
180 TABLET ORAL DAILY
COMMUNITY

## 2023-12-29 RX ORDER — CARVEDILOL 12.5 MG/1
12.5 TABLET ORAL 2 TIMES DAILY WITH MEALS
Status: DISCONTINUED | OUTPATIENT
Start: 2023-12-29 | End: 2024-01-03 | Stop reason: HOSPADM

## 2023-12-29 RX ORDER — PREDNISONE 20 MG/1
40 TABLET ORAL DAILY
Status: COMPLETED | OUTPATIENT
Start: 2023-12-31 | End: 2024-01-02

## 2023-12-29 RX ORDER — ENOXAPARIN SODIUM 100 MG/ML
40 INJECTION SUBCUTANEOUS DAILY
Status: DISCONTINUED | OUTPATIENT
Start: 2023-12-29 | End: 2023-12-29

## 2023-12-29 RX ORDER — MAGNESIUM SULFATE IN WATER 40 MG/ML
2000 INJECTION, SOLUTION INTRAVENOUS ONCE
Status: COMPLETED | OUTPATIENT
Start: 2023-12-29 | End: 2023-12-29

## 2023-12-29 RX ORDER — SODIUM CHLORIDE 0.9 % (FLUSH) 0.9 %
5-40 SYRINGE (ML) INJECTION PRN
Status: DISCONTINUED | OUTPATIENT
Start: 2023-12-29 | End: 2024-01-03 | Stop reason: HOSPADM

## 2023-12-29 RX ORDER — ALBUTEROL SULFATE 90 UG/1
2 AEROSOL, METERED RESPIRATORY (INHALATION) EVERY 4 HOURS PRN
COMMUNITY

## 2023-12-29 RX ORDER — FLUTICASONE FUROATE, UMECLIDINIUM BROMIDE AND VILANTEROL TRIFENATATE 100; 62.5; 25 UG/1; UG/1; UG/1
1 POWDER RESPIRATORY (INHALATION) DAILY
COMMUNITY

## 2023-12-29 RX ORDER — SODIUM CHLORIDE 0.9 % (FLUSH) 0.9 %
5-40 SYRINGE (ML) INJECTION EVERY 12 HOURS SCHEDULED
Status: DISCONTINUED | OUTPATIENT
Start: 2023-12-29 | End: 2024-01-03 | Stop reason: HOSPADM

## 2023-12-29 RX ORDER — POLYETHYLENE GLYCOL 3350 17 G/17G
17 POWDER, FOR SOLUTION ORAL DAILY PRN
Status: DISCONTINUED | OUTPATIENT
Start: 2023-12-29 | End: 2024-01-03 | Stop reason: HOSPADM

## 2023-12-29 RX ORDER — CARVEDILOL 25 MG/1
25 TABLET ORAL 2 TIMES DAILY WITH MEALS
Status: ON HOLD | COMMUNITY
Start: 2023-12-14 | End: 2024-01-03 | Stop reason: HOSPADM

## 2023-12-29 RX ORDER — AZITHROMYCIN 500 MG/1
500 TABLET, FILM COATED ORAL DAILY
Status: COMPLETED | OUTPATIENT
Start: 2023-12-30 | End: 2024-01-01

## 2023-12-29 RX ORDER — WATER 10 ML/10ML
INJECTION INTRAMUSCULAR; INTRAVENOUS; SUBCUTANEOUS
Status: COMPLETED
Start: 2023-12-29 | End: 2023-12-29

## 2023-12-29 RX ORDER — NICOTINE 21 MG/24HR
1 PATCH, TRANSDERMAL 24 HOURS TRANSDERMAL DAILY PRN
Status: DISCONTINUED | OUTPATIENT
Start: 2023-12-29 | End: 2024-01-03 | Stop reason: HOSPADM

## 2023-12-29 RX ORDER — SODIUM CHLORIDE 9 MG/ML
INJECTION, SOLUTION INTRAVENOUS PRN
Status: DISCONTINUED | OUTPATIENT
Start: 2023-12-29 | End: 2024-01-03 | Stop reason: HOSPADM

## 2023-12-29 RX ORDER — PRAVASTATIN SODIUM 20 MG
20 TABLET ORAL DAILY
COMMUNITY
Start: 2023-12-14

## 2023-12-29 RX ORDER — ALBUTEROL SULFATE 2.5 MG/3ML
2.5 SOLUTION RESPIRATORY (INHALATION)
Status: DISCONTINUED | OUTPATIENT
Start: 2023-12-29 | End: 2024-01-03 | Stop reason: HOSPADM

## 2023-12-29 RX ORDER — MAGNESIUM HYDROXIDE/ALUMINUM HYDROXICE/SIMETHICONE 120; 1200; 1200 MG/30ML; MG/30ML; MG/30ML
30 SUSPENSION ORAL EVERY 6 HOURS PRN
Status: DISCONTINUED | OUTPATIENT
Start: 2023-12-29 | End: 2024-01-03 | Stop reason: HOSPADM

## 2023-12-29 RX ORDER — METHYLPREDNISOLONE SODIUM SUCCINATE 40 MG/ML
40 INJECTION, POWDER, LYOPHILIZED, FOR SOLUTION INTRAMUSCULAR; INTRAVENOUS EVERY 6 HOURS
Status: COMPLETED | OUTPATIENT
Start: 2023-12-29 | End: 2023-12-31

## 2023-12-29 RX ORDER — INSULIN LISPRO 100 [IU]/ML
0-8 INJECTION, SOLUTION INTRAVENOUS; SUBCUTANEOUS
Status: DISCONTINUED | OUTPATIENT
Start: 2023-12-29 | End: 2024-01-03 | Stop reason: HOSPADM

## 2023-12-29 RX ORDER — FUROSEMIDE 40 MG/1
40 TABLET ORAL DAILY
Status: DISCONTINUED | OUTPATIENT
Start: 2023-12-29 | End: 2024-01-03 | Stop reason: HOSPADM

## 2023-12-29 RX ORDER — ACETAMINOPHEN 325 MG/1
650 TABLET ORAL EVERY 6 HOURS PRN
Status: DISCONTINUED | OUTPATIENT
Start: 2023-12-29 | End: 2024-01-03 | Stop reason: HOSPADM

## 2023-12-29 RX ORDER — OXYCODONE HYDROCHLORIDE AND ACETAMINOPHEN 5; 325 MG/1; MG/1
1 TABLET ORAL EVERY 4 HOURS PRN
Status: DISCONTINUED | OUTPATIENT
Start: 2023-12-29 | End: 2024-01-03 | Stop reason: HOSPADM

## 2023-12-29 RX ORDER — IPRATROPIUM BROMIDE AND ALBUTEROL SULFATE 2.5; .5 MG/3ML; MG/3ML
1 SOLUTION RESPIRATORY (INHALATION)
Status: DISCONTINUED | OUTPATIENT
Start: 2023-12-29 | End: 2023-12-29

## 2023-12-29 RX ORDER — GUAIFENESIN/DEXTROMETHORPHAN 100-10MG/5
5 SYRUP ORAL EVERY 4 HOURS PRN
Status: DISCONTINUED | OUTPATIENT
Start: 2023-12-29 | End: 2024-01-03 | Stop reason: HOSPADM

## 2023-12-29 RX ORDER — IPRATROPIUM BROMIDE AND ALBUTEROL SULFATE 2.5; .5 MG/3ML; MG/3ML
3 SOLUTION RESPIRATORY (INHALATION) ONCE
Status: COMPLETED | OUTPATIENT
Start: 2023-12-29 | End: 2023-12-29

## 2023-12-29 RX ORDER — FUROSEMIDE 40 MG/1
40 TABLET ORAL DAILY
COMMUNITY

## 2023-12-29 RX ADMIN — METHYLPREDNISOLONE SODIUM SUCCINATE 40 MG: 40 INJECTION INTRAMUSCULAR; INTRAVENOUS at 13:38

## 2023-12-29 RX ADMIN — MOMETASONE FUROATE AND FORMOTEROL FUMARATE DIHYDRATE 2 PUFF: 200; 5 AEROSOL RESPIRATORY (INHALATION) at 19:54

## 2023-12-29 RX ADMIN — SODIUM CHLORIDE, PRESERVATIVE FREE 10 ML: 5 INJECTION INTRAVENOUS at 20:09

## 2023-12-29 RX ADMIN — MAGNESIUM SULFATE HEPTAHYDRATE 2000 MG: 40 INJECTION, SOLUTION INTRAVENOUS at 01:29

## 2023-12-29 RX ADMIN — IPRATROPIUM BROMIDE AND ALBUTEROL SULFATE 1 DOSE: .5; 3 SOLUTION RESPIRATORY (INHALATION) at 11:29

## 2023-12-29 RX ADMIN — SACUBITRIL AND VALSARTAN 1 TABLET: 24; 26 TABLET, FILM COATED ORAL at 08:18

## 2023-12-29 RX ADMIN — OXYCODONE AND ACETAMINOPHEN 1 TABLET: 5; 325 TABLET ORAL at 08:41

## 2023-12-29 RX ADMIN — FUROSEMIDE 40 MG: 40 TABLET ORAL at 08:18

## 2023-12-29 RX ADMIN — IPRATROPIUM BROMIDE AND ALBUTEROL SULFATE 1 DOSE: .5; 3 SOLUTION RESPIRATORY (INHALATION) at 16:54

## 2023-12-29 RX ADMIN — OXYCODONE AND ACETAMINOPHEN 1 TABLET: 5; 325 TABLET ORAL at 20:09

## 2023-12-29 RX ADMIN — OXYCODONE AND ACETAMINOPHEN 1 TABLET: 5; 325 TABLET ORAL at 13:38

## 2023-12-29 RX ADMIN — IPRATROPIUM BROMIDE AND ALBUTEROL SULFATE 3 DOSE: .5; 3 SOLUTION RESPIRATORY (INHALATION) at 01:41

## 2023-12-29 RX ADMIN — GUAIFENESIN SYRUP AND DEXTROMETHORPHAN 5 ML: 100; 10 SYRUP ORAL at 17:03

## 2023-12-29 RX ADMIN — SODIUM CHLORIDE, PRESERVATIVE FREE 10 ML: 5 INJECTION INTRAVENOUS at 08:20

## 2023-12-29 RX ADMIN — AZITHROMYCIN MONOHYDRATE 500 MG: 500 INJECTION, POWDER, LYOPHILIZED, FOR SOLUTION INTRAVENOUS at 02:46

## 2023-12-29 RX ADMIN — WATER 1 ML: 1 INJECTION INTRAMUSCULAR; INTRAVENOUS; SUBCUTANEOUS at 13:38

## 2023-12-29 RX ADMIN — METHYLPREDNISOLONE SODIUM SUCCINATE 40 MG: 40 INJECTION INTRAMUSCULAR; INTRAVENOUS at 20:09

## 2023-12-29 RX ADMIN — CEFTRIAXONE 1000 MG: 1 INJECTION, POWDER, FOR SOLUTION INTRAMUSCULAR; INTRAVENOUS at 02:10

## 2023-12-29 RX ADMIN — RIVAROXABAN 20 MG: 20 TABLET, FILM COATED ORAL at 08:18

## 2023-12-29 RX ADMIN — CARVEDILOL 12.5 MG: 12.5 TABLET, FILM COATED ORAL at 08:18

## 2023-12-29 RX ADMIN — ALBUTEROL SULFATE 5 MG: 2.5 SOLUTION RESPIRATORY (INHALATION) at 02:39

## 2023-12-29 RX ADMIN — SACUBITRIL AND VALSARTAN 1 TABLET: 24; 26 TABLET, FILM COATED ORAL at 20:09

## 2023-12-29 RX ADMIN — MOMETASONE FUROATE AND FORMOTEROL FUMARATE DIHYDRATE 2 PUFF: 200; 5 AEROSOL RESPIRATORY (INHALATION) at 11:29

## 2023-12-29 RX ADMIN — METHYLPREDNISOLONE SODIUM SUCCINATE 40 MG: 40 INJECTION INTRAMUSCULAR; INTRAVENOUS at 08:19

## 2023-12-29 RX ADMIN — IPRATROPIUM BROMIDE AND ALBUTEROL SULFATE 1 DOSE: .5; 3 SOLUTION RESPIRATORY (INHALATION) at 19:54

## 2023-12-29 RX ADMIN — CARVEDILOL 12.5 MG: 12.5 TABLET, FILM COATED ORAL at 17:02

## 2023-12-29 ASSESSMENT — PAIN DESCRIPTION - DESCRIPTORS
DESCRIPTORS: ACHING

## 2023-12-29 ASSESSMENT — PAIN DESCRIPTION - PAIN TYPE
TYPE: CHRONIC PAIN
TYPE: CHRONIC PAIN

## 2023-12-29 ASSESSMENT — LIFESTYLE VARIABLES
HOW OFTEN DO YOU HAVE A DRINK CONTAINING ALCOHOL: MONTHLY OR LESS
HOW MANY STANDARD DRINKS CONTAINING ALCOHOL DO YOU HAVE ON A TYPICAL DAY: 1 OR 2

## 2023-12-29 ASSESSMENT — PAIN DESCRIPTION - LOCATION
LOCATION: BACK

## 2023-12-29 ASSESSMENT — PAIN SCALES - GENERAL
PAINLEVEL_OUTOF10: 2
PAINLEVEL_OUTOF10: 6
PAINLEVEL_OUTOF10: 6
PAINLEVEL_OUTOF10: 5
PAINLEVEL_OUTOF10: 0
PAINLEVEL_OUTOF10: 5
PAINLEVEL_OUTOF10: 2
PAINLEVEL_OUTOF10: 0

## 2023-12-29 ASSESSMENT — PAIN DESCRIPTION - ORIENTATION: ORIENTATION: LOWER;MID;POSTERIOR

## 2023-12-29 ASSESSMENT — PAIN DESCRIPTION - FREQUENCY: FREQUENCY: CONTINUOUS

## 2023-12-29 ASSESSMENT — PAIN DESCRIPTION - ONSET
ONSET: ON-GOING
ONSET: ON-GOING

## 2023-12-29 NOTE — ASSESSMENT & PLAN NOTE
Has a history of diabetes, but recent A1c levels have been reassuring.  Check A1c.  SSI.  Monitor glucose trend and insulin demands while utilizing steroids and add basal insulin if warranted.

## 2023-12-29 NOTE — PROGRESS NOTES
[facility]  Respiratory Therapy  Non-Invasive Ventilation    Name:  Mark Jorge  Medical Record Number:  [id number]  Age: 73 y.o.   : 1950  Today's Date:  2023  Room:  [address] [place] [address]      Assessment      /82   Pulse 70   Temp 97.7 °F (36.5 °C) (Axillary)   Resp 26   SpO2 100%     Patient Active Problem List   Diagnosis    Lower abdominal pain    Hematuria    HTN (hypertension)    Type 2 diabetes mellitus without complication (HCC)    Constipation    Acute encephalopathy    Septic shock (HCC)    Lactic acidosis    Perforated viscus    Peritonitis, acute generalized (HCC)    SHARONDA (acute kidney injury) (HCC)    Thrombocytopenia (HCC)    Hypernatremia    Leukocytosis    Hyperglycemia    Fecal peritonitis (HCC)    VF (ventricular fibrillation) (HCC)    Cardiomyopathy (HCC)    MRSA (methicillin resistant Staphylococcus aureus) infection    Colostomy in place (HCC)    Tobacco abuse    Perforated sigmoid colon (HCC)    Incisional hernia without obstruction or gangrene    Acute on chronic systolic congestive heart failure (HCC)    Acute respiratory acidosis (HCC)    Acute respiratory failure with hypoxia and hypercarbia (HCC)    Acute pulmonary edema (HCC)    Pleural effusion    Hyperlipidemia    A-fib (HCC)    Centrilobular emphysema (HCC)    Acute congestive heart failure (HCC)    H/O ventral hernia    History of ventral hernia    Acute respiratory failure (HCC)    COPD exacerbation (HCC)    Pain of upper abdomen    Left inguinal hernia    Acute respiratory failure with hypoxia (HCC)    Partial small bowel obstruction (HCC)       Social History  Social History     Tobacco Use    Smoking status: Every Day     Current packs/day: 1.00     Average packs/day: 1 pack/day for 25.0 years (25.0 ttl pk-yrs)     Types: Cigarettes    Smokeless tobacco: Never   Vaping Use    Vaping Use: Never used   Substance Use Topics    Alcohol use: Yes     Comment: occ    Drug use: No         Set-up

## 2023-12-29 NOTE — CARE COORDINATION
Case Management Assessment  Initial Evaluation    Date/Time of Evaluation: 12/29/2023 11:14 AM  Assessment Completed by: Lashae Hall RN    If patient is discharged prior to next notation, then this note serves as note for discharge by case management.    Patient Name: Mark Jorge                   YOB: 1950  Diagnosis: Septicemia (HCC) [A41.9]  Acute exacerbation of chronic obstructive pulmonary disease (COPD) (HCC) [J44.1]  Acute respiratory failure with hypoxia and hypercapnia (HCC) [J96.01, J96.02]                   Date / Time: 12/29/2023  1:22 AM    Patient Admission Status: Inpatient   Readmission Risk (Low < 19, Mod (19-27), High > 27): Readmission Risk Score: 9.2    Current PCP: Ella Velasquez APRN - CNP  PCP verified by CM? Yes    Chart Reviewed: Yes      History Provided by: Patient  Patient Orientation: Alert and Oriented    Patient Cognition: Alert    Hospitalization in the last 30 days (Readmission):  No    If yes, Readmission Assessment in  Navigator will be completed.    Advance Directives:      Code Status: Full Code   Patient's Primary Decision Maker is: Patient Declined (Legal Next of Kin Remains as Decision Maker)    Primary Decision Maker: Aaron Goodrich - Other - [phone]    Secondary Decision Maker: NATHAN CEJA - [phone]    Discharge Planning:    Patient lives with: Alone Type of Home: Apartment (7 steps with bilateral railings)  Primary Care Giver: Self  Patient Support Systems include: Friends/Neighbors   Current Financial resources: Medicare, Medicaid  Current community resources: None  Current services prior to admission: None            Current DME:              Type of Home Care services:  None    ADLS  Prior functional level: Independent in ADLs/IADLs  Current functional level: Independent in ADLs/IADLs    PT AM-PAC:   /24  OT AM-PAC:   /24    Family can provide assistance at DC: No  Would you like Case Management to discuss the discharge  plan with any other family members/significant others, and if so, who? Yes (friend if needed)  Plans to Return to Present Housing: Yes  Other Identified Issues/Barriers to RETURNING to current housing: lives alone  Potential Assistance needed at discharge: N/A            Potential DME:    Patient expects to discharge to: Apartment  Plan for transportation at discharge: Friends    Financial    Payor: BENJY RAI / Plan: BENJY PATEL [place] DUAL / Product Type: *No Product type* /     Does insurance require precert for SNF: Yes    Potential assistance Purchasing Medications: No  Meds-to-Beds request:        [facility] - [place], [place] - [address] - P [phone] - F [phone]  [address]  [facility] [address]  Phone: [phone] Fax: [phone]      Notes:    Factors facilitating achievement of predicted outcomes: Friend support, Cooperative, and Pleasant    Barriers to discharge: Limited family support    Additional Case Management Notes: Patient will return home independently. Declines wanting home care. Friend can transport home.    The Plan for Transition of Care is related to the following treatment goals of Septicemia (HCC) [A41.9]  Acute exacerbation of chronic obstructive pulmonary disease (COPD) (HCC) [J44.1]  Acute respiratory failure with hypoxia and hypercapnia (HCC) [J96.01, J96.02]    Lashae Hall RN  Case Management Department  Ph: [phone]

## 2023-12-29 NOTE — ASSESSMENT & PLAN NOTE
Primary cause of patients need for admission causing hypoxia/hypercapnia/SIRS and lactic acidosis.  Overall low suspicion for bacterial infection or pneumonia.  Lactic acid trending normal after breathing treatments and BiPAP.  Scheduled and as needed DuoNebs.  Continue home inhalers.  Steroids.  Continue with azithromycin daily.  Pulmonary rehab consult appreciated.  Continue BiPAP for now.  Repeat VBG in the a.m. to see if we can taper off.

## 2023-12-29 NOTE — PLAN OF CARE
Problem: Discharge Planning  Goal: Discharge to home or other facility with appropriate resources  Outcome: Progressing  Flowsheets (Taken 12/29/2023 9904)  Discharge to home or other facility with appropriate resources:   Identify barriers to discharge with patient and caregiver   Identify discharge learning needs (meds, wound care, etc)     Problem: Safety - Adult  Goal: Free from fall injury  Outcome: Progressing

## 2023-12-29 NOTE — ASSESSMENT & PLAN NOTE
Mild elevation of BNP.  Last known EF of 35 to 40%.  Continue home medications as ordered.  Monitor I's/O.  Consider echocardiogram.  Patient does not appear to be fluid overloaded.

## 2023-12-29 NOTE — ASSESSMENT & PLAN NOTE
Suspect acute phase reactants secondary to COPD exacerbation.  Considered other potential sources of bacterial infection contributing to SIRS, but clinical picture is not consistent with sepsis secondary to bacterial infection.  No clear clinical indication to continue antibiotics at this time.  Check procalcitonin level.  Consider further workup for bacterial sources pending clinical course.

## 2023-12-29 NOTE — ED PROVIDER NOTES
[facility] EMERGENCY DEPARTMENT      EMERGENCY MEDICINE     Pt Name: Mark Jorge  MRN: [id number]  Birthdate 1950  Date of evaluation: 12/29/2023  Provider: Vin Scott MD    CHIEF COMPLAINT       Chief Complaint   Patient presents with    Respiratory Distress     Pt arrives via ems in respiratory distress; unable to tolerate nonrebreather and placed on cpap en route; 1 duoneb given en route     HISTORY OF PRESENT ILLNESS   Mark Jorge is a 73 y.o. male who presents to the emergency department for shortness of breath worsening last 3 days.  Arrived via EMS on CPAP significantly somnolent, increased work of breathing.  Was transitioned to BiPAP immediately did complain of chest pain and shortness of breath.      PASTMEDICAL HISTORY     Past Medical History:   Diagnosis Date    A-fib ([facility])     COPD (chronic obstructive pulmonary disease) ([facility])     Diabetes mellitus ([facility])     borderline    Full dentures     Hemorrhoid     Hyperlipidemia     Hypertension     MRSA infection 09/15/2016    sputum    MRSA nasal colonization 04/20/2017    also 7/4/17    Pacemaker     defibrillator       Patient Active Problem List   Diagnosis Code    Lower abdominal pain R10.30    Hematuria R31.9    HTN (hypertension) I10    Type 2 diabetes mellitus without complication ([facility]) E11.9    Constipation K59.00    Acute encephalopathy G93.40    Septic shock ([facility]) A41.9, R65.21    Lactic acidosis E87.20    Perforated viscus R19.8    Peritonitis, acute generalized ([facility]) K65.0    SHARONDA (acute kidney injury) ([facility]) N17.9    Thrombocytopenia ([facility]) D69.6    Hypernatremia E87.0    Leukocytosis D72.829    Hyperglycemia R73.9    Fecal peritonitis ([facility]) K65.8    VF (ventricular fibrillation) ([facility]) I49.01    Cardiomyopathy ([facility]) I42.9    MRSA (methicillin resistant Staphylococcus aureus) infection A49.02    Colostomy in place ([facility]) Z93.3    Tobacco abuse Z72.0    Perforated sigmoid colon ([facility]) K63.1    Incisional

## 2023-12-29 NOTE — PROGRESS NOTES
Patient arrived to floor via stretcher from ED and transferred to bed. Telemetry activated and confirmed with [facility]. Patient oriented to room and use of call light. Call light and personal items within reach. Admission and assessment initiated. POC and education initiated and reviewed with patient. Denied further needs or questions at this time. On 4 L nasal cannula on arrival.

## 2023-12-29 NOTE — PROGRESS NOTES
4 Eyes Skin Assessment     NAME:  Mark Jorge  YOB: 1950  MEDICAL RECORD NUMBER:  [id number]    The patient is being assessed for  Admission    I agree that at least one RN has performed a thorough Head to Toe Skin Assessment on the patient. ALL assessment sites listed below have been assessed.      Areas assessed by both nurses:    Head, Face, Ears, Shoulders, Back, Chest, Arms, Elbows, Hands, Sacrum. Buttock, Coccyx, Ischium, and Legs. Feet and Heels        Does the Patient have a Wound? No noted wound(s)       Brandin Prevention initiated by RN: No  Wound Care Orders initiated by RN: No    Pressure Injury (Stage 3,4, Unstageable, DTI, NWPT, and Complex wounds) if present, place Wound referral order by RN under : No    New Ostomies, if present place, Ostomy referral order under : No     Nurse 1 eSignature: Electronically signed by Sary Dawkins RN on 12/29/23 at 5:51 AM EST    **SHARE this note so that the co-signing nurse can place an eSignature**    Nurse 2 eSignature: Electronically signed by Angélica Leary RN on 12/29/23 at 6:32 AM EST

## 2023-12-29 NOTE — H&P
acute phase reactants secondary to COPD exacerbation.  Considered other potential sources of bacterial infection contributing to SIRS, but clinical picture is not consistent with sepsis secondary to bacterial infection.  No clear clinical indication to continue antibiotics at this time.  Check procalcitonin level.  Consider further workup for bacterial sources pending clinical course.    Lactic acidosis       Likely secondary to his respiratory failure.  Resolved.    Hyperglycemia       Has a history of diabetes, but recent A1c levels have been reassuring.  Check A1c.  SSI.  Monitor glucose trend and insulin demands while utilizing steroids and add basal insulin if warranted.            See above for problem focused plan.      All other systems appear either stable or near patient's baseline at time of admission. Continue to monitor clinical status closely overnight and adjust plan accordingly.  Continue home medications overnight as ordered for now and adjust medications as needed throughout hospital stay.  Any urgent/emergent consult for the night has been called by either myself or the ED team.  I will be available overnight for any urgent needs until daytime hospitalist team assumes primary attending responsibility at 7 AM.    Reviewed and interpreted: Details related to the patient's presentation that were available at time of admission EMR including but not limited to prior notes, medications, imaging, lab results, and vital signs.    Ordered additional pertinent test as indicated on night of admission to reflect ongoing differential diagnosis, assessment, and plan as noted above and in the EMR.  Discussed pertinent details of case with ED team including physician/JERARDO/nursing staff as available at time of admission request.          VTE PPx: xarelto        Code Status: full    Thorough discussion with patient regarding goals of care.  Patient competent to make own decisions.  No formal plan desired at this  times daily    Ishmael Tanner MD   Umeclidinium Bromide (INCRUSE ELLIPTA) 62.5 MCG/INH AEPB Inhale 1 puff into the lungs daily 3/9/18   Belkis Patel APRN - CNP   furosemide (LASIX) 40 MG tablet Take 1 tablet by mouth 2 times daily 7/5/17   Caleb Vincent MD   montelukast (SINGULAIR) 10 MG tablet Take 10 mg by mouth daily  10/27/16   Ishmael Tanner MD   rivaroxaban (XARELTO) 20 MG TABS tablet Take 20 mg by mouth daily    Ishmael Tanner MD   pravastatin (PRAVACHOL) 10 MG tablet Take 10 mg by mouth daily    Ishmael Tanner MD   sitaGLIPtin (JANUVIA) 100 MG tablet Take 100 mg by mouth daily    Ishmael Tanner MD     Infusion Medications   Scheduled Medications   PRN Meds:         Physical Exam Performed:      /73   Pulse 70   Temp 97.7 °F (36.5 °C) (Axillary)   Resp 22   SpO2 100%     General appearance: Chronically ill-appearing, respiratory distress, on BiPAP  Respiratory: Increased work of breathing, BiPAP wheezing, no appreciable crackles   Cardiovascular:  Regular rate and rhythm.  Pulses intact in all extremities  GI/:  Soft, non-tender, non-distended. Normoactive bowel sounds.  Musculoskelatal:  No significant edema. Normal ROM.  Neurologic:  cranial nerves grossly intact, GCS 15, no apparent focal deficits  Psychiatric:  Alert and oriented      Labs:  Personally reviewed and interpreted for clinical significance and to assist with MDM    Recent Labs     12/29/23 0131   WBC 13.9*   HGB 17.2   HCT 51.8        Recent Labs     12/29/23 0131      K 4.9   CL 97*   CO2 24   BUN 10   CREATININE 1.0   CALCIUM 9.6     Recent Labs     12/29/23  0131   AST 26   ALT 15   BILITOT 1.1*   ALKPHOS 58       Recent Labs     12/29/23  0131 12/29/23  0251   TROPHS 21 15         Lipids:   Lab Results   Component Value Date/Time    CHOL 187 07/19/2012 08:45 AM    HDL 55 07/19/2012 08:45 AM    TRIG 102 09/22/2016 06:15 AM     Hemoglobin A1C:   Lab Results   Component

## 2023-12-29 NOTE — PROGRESS NOTES
Patient admitted after midnight, seen and examined.  Overall he feels improved but is still very weak.  Consult pulmonology for acute hypoxic hypercapnic respiratory failure.  Patient does not wear oxygen or BiPAP/CPAP at home.  Lactic acidosis has resolved.  Leukocytosis has resolved.  pH and pCO2 have improved.  Continue azithromycin and DuoNebs for COPD exacerbation.  Plan of care reviewed with patient, he verbalized understanding and agreement.

## 2023-12-29 NOTE — ASSESSMENT & PLAN NOTE
Secondary to COPD exacerbation.  See above.  Likely contributing to SIRS and lactic acidosis that is now resolving.

## 2023-12-29 NOTE — CONSULTS
pm    TECHNIQUE:  CT of the chest was performed without the administration of intravenous  contrast. Multiplanar reformatted images are provided for review. Automated  exposure control, iterative reconstruction, and/or weight based adjustment of  the mA/kV was utilized to reduce the radiation dose to as low as reasonably  achievable.    COMPARISON:  CT lung cancer screening September 8, 2022    HISTORY:  ORDERING SYSTEM PROVIDED HISTORY: Other nonspecific abnormal finding of lung  field  TECHNOLOGIST PROVIDED HISTORY:  Reason for Exam: Other nonspecific abnormal finding of lung field lung  nodules, hx pacemaker    FINDINGS:  Mediastinum: Pacemaker is in place.  Heart is normal in size.  Mild  multi-vessel coronary artery calcification.  Scattered calcified plaque in  the thoracic aorta.  No adenopathy.  Thyroid and esophagus are normal.    Lungs/pleura: Mild centrilobular pulmonary emphysema similar to prior  examination.  The 5 mm nodule in the right upper lobe more laterally is  decreased and now measures 2-3 mm in the more medial 5 mm nodule is stable in  size.  These are best appreciated on image 27 and 28 of series 3.  No new or  abnormal pulmonary nodules.  No consolidation, pleural effusion or  pneumothorax.    Upper Abdomen: Left-sided renal cysts.  No acute upper abdominal abnormality.    Soft Tissues/Bones: Mild multilevel degenerative changes of the spine.  No  acute osseous abnormality.    Impression  Pulmonary nodules in the right upper lobe, 1 of which has decreased in size  in the other is stable.  Lung cancer screening exam in 1 year is recommended.      CTPA: No results found for this or any previous visit.      CXR PA/LAT: No results found for this or any previous visit.      CXR portable: Results for orders placed during the hospital encounter of 12/29/23    XR CHEST PORTABLE    Narrative  EXAMINATION:  ONE XRAY VIEW OF THE CHEST    12/29/2023 1:26  am    COMPARISON:  06/24/2021    HISTORY:  ORDERING SYSTEM PROVIDED HISTORY: sob  TECHNOLOGIST PROVIDED HISTORY:  Reason for exam:->sob  Reason for Exam: sob    FINDINGS:  The lungs are without acute focal process.  There is no effusion or  pneumothorax. The cardiomediastinal silhouette is without acute process. The  osseous structures are without acute process.    Impression  No acute process.

## 2023-12-29 NOTE — ED TRIAGE NOTES
Pt arrives via ems in respiratory distress. Pt has hx of copd. Unable to tolerate nonrebreather so placed on cpap en route. Currently placed on bipap in ed. 18guage present in L ac by squad. Verbal  orders for magnesium placed.

## 2023-12-30 LAB
ALBUMIN SERPL-MCNC: 4.6 G/DL (ref 3.4–5)
ALBUMIN/GLOB SERPL: 1.6 {RATIO} (ref 1.1–2.2)
ALP SERPL-CCNC: 71 U/L (ref 40–129)
ALT SERPL-CCNC: 22 U/L (ref 10–40)
ANION GAP SERPL CALCULATED.3IONS-SCNC: 8 MMOL/L (ref 3–16)
AST SERPL-CCNC: 26 U/L (ref 15–37)
BASOPHILS # BLD: 0 K/UL (ref 0–0.2)
BASOPHILS NFR BLD: 0.1 %
BILIRUB SERPL-MCNC: 0.9 MG/DL (ref 0–1)
BUN SERPL-MCNC: 23 MG/DL (ref 7–20)
CALCIUM SERPL-MCNC: 9 MG/DL (ref 8.3–10.6)
CHLORIDE SERPL-SCNC: 97 MMOL/L (ref 99–110)
CO2 SERPL-SCNC: 31 MMOL/L (ref 21–32)
CREAT SERPL-MCNC: 0.8 MG/DL (ref 0.8–1.3)
DEPRECATED RDW RBC AUTO: 13.3 % (ref 12.4–15.4)
EOSINOPHIL # BLD: 0 K/UL (ref 0–0.6)
EOSINOPHIL NFR BLD: 0 %
EST. AVERAGE GLUCOSE BLD GHB EST-MCNC: 131.2 MG/DL
GFR SERPLBLD CREATININE-BSD FMLA CKD-EPI: >60 ML/MIN/{1.73_M2}
GLUCOSE BLD-MCNC: 168 MG/DL (ref 70–99)
GLUCOSE BLD-MCNC: 197 MG/DL (ref 70–99)
GLUCOSE BLD-MCNC: 203 MG/DL (ref 70–99)
GLUCOSE BLD-MCNC: 208 MG/DL (ref 70–99)
GLUCOSE BLD-MCNC: 225 MG/DL (ref 70–99)
GLUCOSE SERPL-MCNC: 185 MG/DL (ref 70–99)
HBA1C MFR BLD: 6.2 %
HCT VFR BLD AUTO: 48.1 % (ref 40.5–52.5)
HGB BLD-MCNC: 16.3 G/DL (ref 13.5–17.5)
LYMPHOCYTES # BLD: 0.5 K/UL (ref 1–5.1)
LYMPHOCYTES NFR BLD: 5.1 %
MCH RBC QN AUTO: 32 PG (ref 26–34)
MCHC RBC AUTO-ENTMCNC: 33.8 G/DL (ref 31–36)
MCV RBC AUTO: 94.5 FL (ref 80–100)
MONOCYTES # BLD: 0.3 K/UL (ref 0–1.3)
MONOCYTES NFR BLD: 2.7 %
NEUTROPHILS # BLD: 9.3 K/UL (ref 1.7–7.7)
NEUTROPHILS NFR BLD: 92.1 %
PERFORMED ON: ABNORMAL
PLATELET # BLD AUTO: 214 K/UL (ref 135–450)
PMV BLD AUTO: 8.4 FL (ref 5–10.5)
POTASSIUM SERPL-SCNC: 4.8 MMOL/L (ref 3.5–5.1)
PROT SERPL-MCNC: 7.4 G/DL (ref 6.4–8.2)
RBC # BLD AUTO: 5.09 M/UL (ref 4.2–5.9)
SODIUM SERPL-SCNC: 136 MMOL/L (ref 136–145)
WBC # BLD AUTO: 10.1 K/UL (ref 4–11)

## 2023-12-30 PROCEDURE — 2060000000 HC ICU INTERMEDIATE R&B

## 2023-12-30 PROCEDURE — 6370000000 HC RX 637 (ALT 250 FOR IP): Performed by: NURSE PRACTITIONER

## 2023-12-30 PROCEDURE — 94760 N-INVAS EAR/PLS OXIMETRY 1: CPT

## 2023-12-30 PROCEDURE — 94640 AIRWAY INHALATION TREATMENT: CPT

## 2023-12-30 PROCEDURE — 80053 COMPREHEN METABOLIC PANEL: CPT

## 2023-12-30 PROCEDURE — 6360000002 HC RX W HCPCS: Performed by: FAMILY MEDICINE

## 2023-12-30 PROCEDURE — 2580000003 HC RX 258: Performed by: FAMILY MEDICINE

## 2023-12-30 PROCEDURE — 2580000003 HC RX 258

## 2023-12-30 PROCEDURE — 6370000000 HC RX 637 (ALT 250 FOR IP): Performed by: FAMILY MEDICINE

## 2023-12-30 PROCEDURE — 36415 COLL VENOUS BLD VENIPUNCTURE: CPT

## 2023-12-30 PROCEDURE — 99232 SBSQ HOSP IP/OBS MODERATE 35: CPT | Performed by: INTERNAL MEDICINE

## 2023-12-30 PROCEDURE — 83036 HEMOGLOBIN GLYCOSYLATED A1C: CPT

## 2023-12-30 PROCEDURE — 2700000000 HC OXYGEN THERAPY PER DAY

## 2023-12-30 PROCEDURE — 85025 COMPLETE CBC W/AUTO DIFF WBC: CPT

## 2023-12-30 RX ORDER — WATER 10 ML/10ML
INJECTION INTRAMUSCULAR; INTRAVENOUS; SUBCUTANEOUS
Status: COMPLETED
Start: 2023-12-30 | End: 2023-12-30

## 2023-12-30 RX ORDER — LOPERAMIDE HYDROCHLORIDE 2 MG/1
2 CAPSULE ORAL 4 TIMES DAILY PRN
Status: DISCONTINUED | OUTPATIENT
Start: 2023-12-30 | End: 2024-01-03 | Stop reason: HOSPADM

## 2023-12-30 RX ADMIN — METHYLPREDNISOLONE SODIUM SUCCINATE 40 MG: 40 INJECTION INTRAMUSCULAR; INTRAVENOUS at 08:24

## 2023-12-30 RX ADMIN — WATER 10 ML: 1 INJECTION INTRAMUSCULAR; INTRAVENOUS; SUBCUTANEOUS at 20:40

## 2023-12-30 RX ADMIN — INSULIN LISPRO 2 UNITS: 100 INJECTION, SOLUTION INTRAVENOUS; SUBCUTANEOUS at 00:02

## 2023-12-30 RX ADMIN — OXYCODONE AND ACETAMINOPHEN 1 TABLET: 5; 325 TABLET ORAL at 22:30

## 2023-12-30 RX ADMIN — IPRATROPIUM BROMIDE AND ALBUTEROL SULFATE 1 DOSE: .5; 3 SOLUTION RESPIRATORY (INHALATION) at 15:38

## 2023-12-30 RX ADMIN — INSULIN LISPRO 2 UNITS: 100 INJECTION, SOLUTION INTRAVENOUS; SUBCUTANEOUS at 17:41

## 2023-12-30 RX ADMIN — METHYLPREDNISOLONE SODIUM SUCCINATE 40 MG: 40 INJECTION INTRAMUSCULAR; INTRAVENOUS at 13:15

## 2023-12-30 RX ADMIN — IPRATROPIUM BROMIDE AND ALBUTEROL SULFATE 1 DOSE: .5; 3 SOLUTION RESPIRATORY (INHALATION) at 07:50

## 2023-12-30 RX ADMIN — CARVEDILOL 12.5 MG: 12.5 TABLET, FILM COATED ORAL at 08:24

## 2023-12-30 RX ADMIN — CARVEDILOL 12.5 MG: 12.5 TABLET, FILM COATED ORAL at 17:22

## 2023-12-30 RX ADMIN — Medication 10 ML: at 01:53

## 2023-12-30 RX ADMIN — OXYCODONE AND ACETAMINOPHEN 1 TABLET: 5; 325 TABLET ORAL at 13:15

## 2023-12-30 RX ADMIN — GUAIFENESIN SYRUP AND DEXTROMETHORPHAN 5 ML: 100; 10 SYRUP ORAL at 08:24

## 2023-12-30 RX ADMIN — METHYLPREDNISOLONE SODIUM SUCCINATE 40 MG: 40 INJECTION INTRAMUSCULAR; INTRAVENOUS at 01:53

## 2023-12-30 RX ADMIN — SACUBITRIL AND VALSARTAN 1 TABLET: 24; 26 TABLET, FILM COATED ORAL at 20:40

## 2023-12-30 RX ADMIN — FUROSEMIDE 40 MG: 40 TABLET ORAL at 08:24

## 2023-12-30 RX ADMIN — INSULIN LISPRO 2 UNITS: 100 INJECTION, SOLUTION INTRAVENOUS; SUBCUTANEOUS at 08:31

## 2023-12-30 RX ADMIN — SODIUM CHLORIDE, PRESERVATIVE FREE 10 ML: 5 INJECTION INTRAVENOUS at 08:24

## 2023-12-30 RX ADMIN — LOPERAMIDE HYDROCHLORIDE 2 MG: 2 CAPSULE ORAL at 13:48

## 2023-12-30 RX ADMIN — SACUBITRIL AND VALSARTAN 1 TABLET: 24; 26 TABLET, FILM COATED ORAL at 08:24

## 2023-12-30 RX ADMIN — METHYLPREDNISOLONE SODIUM SUCCINATE 40 MG: 40 INJECTION INTRAMUSCULAR; INTRAVENOUS at 20:39

## 2023-12-30 RX ADMIN — MOMETASONE FUROATE AND FORMOTEROL FUMARATE DIHYDRATE 2 PUFF: 200; 5 AEROSOL RESPIRATORY (INHALATION) at 19:40

## 2023-12-30 RX ADMIN — AZITHROMYCIN 500 MG: 500 TABLET, FILM COATED ORAL at 08:24

## 2023-12-30 RX ADMIN — IPRATROPIUM BROMIDE AND ALBUTEROL SULFATE 1 DOSE: .5; 3 SOLUTION RESPIRATORY (INHALATION) at 19:40

## 2023-12-30 RX ADMIN — RIVAROXABAN 20 MG: 20 TABLET, FILM COATED ORAL at 08:24

## 2023-12-30 RX ADMIN — OXYCODONE AND ACETAMINOPHEN 1 TABLET: 5; 325 TABLET ORAL at 08:24

## 2023-12-30 RX ADMIN — MOMETASONE FUROATE AND FORMOTEROL FUMARATE DIHYDRATE 2 PUFF: 200; 5 AEROSOL RESPIRATORY (INHALATION) at 07:50

## 2023-12-30 RX ADMIN — SODIUM CHLORIDE, PRESERVATIVE FREE 10 ML: 5 INJECTION INTRAVENOUS at 20:40

## 2023-12-30 RX ADMIN — IPRATROPIUM BROMIDE AND ALBUTEROL SULFATE 1 DOSE: .5; 3 SOLUTION RESPIRATORY (INHALATION) at 11:34

## 2023-12-30 RX ADMIN — INSULIN LISPRO 2 UNITS: 100 INJECTION, SOLUTION INTRAVENOUS; SUBCUTANEOUS at 20:40

## 2023-12-30 ASSESSMENT — PAIN SCALES - GENERAL
PAINLEVEL_OUTOF10: 0
PAINLEVEL_OUTOF10: 0
PAINLEVEL_OUTOF10: 7
PAINLEVEL_OUTOF10: 5
PAINLEVEL_OUTOF10: 7

## 2023-12-30 ASSESSMENT — PAIN DESCRIPTION - LOCATION: LOCATION: BACK

## 2023-12-30 NOTE — PROGRESS NOTES
At 0630 pt requested to wear O2 via NC as he feels breathless when he wakes up from his sleep.Sp02 94%.Kept 2L via NC just to make comfortable.

## 2023-12-30 NOTE — PLAN OF CARE
Problem: Discharge Planning  Goal: Discharge to home or other facility with appropriate resources  Outcome: Progressing     Problem: Safety - Adult  Goal: Free from fall injury  Outcome: Progressing     Problem: Pain  Goal: Verbalizes/displays adequate comfort level or baseline comfort level  Outcome: Progressing  Flowsheets (Taken 12/29/2023 2000)  Verbalizes/displays adequate comfort level or baseline comfort level:   Encourage patient to monitor pain and request assistance   Assess pain using appropriate pain scale   Administer analgesics based on type and severity of pain and evaluate response   Implement non-pharmacological measures as appropriate and evaluate response   Notify Licensed Independent Practitioner if interventions unsuccessful or patient reports new pain     Problem: Respiratory - Adult  Goal: Achieves optimal ventilation and oxygenation  Outcome: Progressing     Problem: Metabolic/Fluid and Electrolytes - Adult  Goal: Electrolytes maintained within normal limits  Outcome: Progressing

## 2023-12-30 NOTE — PROGRESS NOTES
Patient refused nebulizer after 2 minutes, stating he couldn't take \"all this sh**\",  should have been out of here yesterday\".  Patient was actively coughing when I arrived in room, BBS diminished with wheezes scattered throughout.  States he's not taking anything else till he talks to doctor because \"you all are just selling your stuff\".  Tried to encourage him to take neb due to active wheezing but he refused.

## 2023-12-30 NOTE — PROGRESS NOTES
Hospitalist Progress Note      PCP: Ella Velasquez, APRN - CNP    Date of Admission: 12/29/2023    Chief Complaint: shorteness of breath    Hospital Course: Mark Jorge is a 73 y.o. male with a history including but not limited to COPD (still smokes), HFrEF (based on review of prior ECHO), prior DVT (on xarelto chronically), and HTN who presented via EMS where he was found to be grayish in appearance and extreme short of breath with notable hypoxia.  Patient initially placed on nonrebreather and transferred to CPAP en route.  He was given Solu-Medrol en route.  Transition to BiPAP in the ED.  Significantly improved after initial nebulizer treatments and BiPAP in the ED.  Initial lactic acidosis profoundly elevated.  Patient has not been having a fever.  He does have a hoarse cough.  Endorses wheezing.  No peripheral edema.  No orthopnea.  No chest pain or pleurisy.  No productive cough.  No recent sick contacts.  Admitted for copd exacerbation.     Subjective: Pt sitting on side of bed, he refused breathing treatment this am. Did not understand role of breathing treatment and need for frequency. After discussion he is agreeable to take them. Pt states he feels better, but not at baseline. Asking for tele and continuous pulse ox to be taken off. Denies cp palpitations headache abd pain. Reviewed POC, denies needs.     Assessment/Plan:    Acute exacerbation COPD  -pulm consulted  -continue duonebs, dullera, prednisone  -o2 as needed  -bipap   -continue azithromycin    Acute hypoxic and hypercapnia respiratory failure  -2/2 to copd exacerbation  -continue bipap and O2 as needed  -O2 weaned to RA    SIRS  Lactic acidosis  -lactic acid 8.7 on admission, now 1.9  -treated with IVF  -2/2 to respiratory failure    PMHx chronic HFrEF, afib, hld, htn, pacemaker  -continue home  meds  -HR controlled, pt request tele be dc'd  -continue xarelto    Hyperglycemia  -A1c 6.1    Tobacco abuse  -encouraged smoking

## 2023-12-31 LAB
ALBUMIN SERPL-MCNC: 4 G/DL (ref 3.4–5)
ALBUMIN/GLOB SERPL: 1.4 {RATIO} (ref 1.1–2.2)
ALP SERPL-CCNC: 60 U/L (ref 40–129)
ALT SERPL-CCNC: 22 U/L (ref 10–40)
ANION GAP SERPL CALCULATED.3IONS-SCNC: 10 MMOL/L (ref 3–16)
AST SERPL-CCNC: 30 U/L (ref 15–37)
BASE EXCESS BLDA CALC-SCNC: 4.8 MMOL/L (ref -3–3)
BASOPHILS # BLD: 0 K/UL (ref 0–0.2)
BASOPHILS NFR BLD: 0 %
BILIRUB SERPL-MCNC: 0.7 MG/DL (ref 0–1)
BUN SERPL-MCNC: 24 MG/DL (ref 7–20)
CALCIUM SERPL-MCNC: 8.9 MG/DL (ref 8.3–10.6)
CHLORIDE SERPL-SCNC: 100 MMOL/L (ref 99–110)
CO2 BLDA-SCNC: 31.7 MMOL/L
CO2 SERPL-SCNC: 31 MMOL/L (ref 21–32)
COHGB MFR BLDA: 1.1 % (ref 0–1.5)
CREAT SERPL-MCNC: 0.9 MG/DL (ref 0.8–1.3)
DEPRECATED RDW RBC AUTO: 13.3 % (ref 12.4–15.4)
EOSINOPHIL # BLD: 0 K/UL (ref 0–0.6)
EOSINOPHIL NFR BLD: 0.1 %
GFR SERPLBLD CREATININE-BSD FMLA CKD-EPI: >60 ML/MIN/{1.73_M2}
GLUCOSE BLD-MCNC: 173 MG/DL (ref 70–99)
GLUCOSE BLD-MCNC: 182 MG/DL (ref 70–99)
GLUCOSE BLD-MCNC: 195 MG/DL (ref 70–99)
GLUCOSE BLD-MCNC: 202 MG/DL (ref 70–99)
GLUCOSE SERPL-MCNC: 192 MG/DL (ref 70–99)
HCO3 BLDA-SCNC: 30.3 MMOL/L (ref 21–29)
HCT VFR BLD AUTO: 47 % (ref 40.5–52.5)
HGB BLD-MCNC: 15.7 G/DL (ref 13.5–17.5)
HGB BLDA-MCNC: 15.9 G/DL (ref 13.5–17.5)
LYMPHOCYTES # BLD: 0.5 K/UL (ref 1–5.1)
LYMPHOCYTES NFR BLD: 3.9 %
MCH RBC QN AUTO: 31.6 PG (ref 26–34)
MCHC RBC AUTO-ENTMCNC: 33.5 G/DL (ref 31–36)
MCV RBC AUTO: 94.5 FL (ref 80–100)
METHGB MFR BLDA: 0.4 %
MONOCYTES # BLD: 0.4 K/UL (ref 0–1.3)
MONOCYTES NFR BLD: 2.8 %
NEUTROPHILS # BLD: 12.3 K/UL (ref 1.7–7.7)
NEUTROPHILS NFR BLD: 93.2 %
O2 THERAPY: ABNORMAL
ORGANISM: ABNORMAL
PCO2 BLDA: 46.6 MMHG (ref 35–45)
PERFORMED ON: ABNORMAL
PH BLDA: 7.42 [PH] (ref 7.35–7.45)
PLATELET # BLD AUTO: 251 K/UL (ref 135–450)
PMV BLD AUTO: 8.5 FL (ref 5–10.5)
PO2 BLDA: 83.1 MMHG (ref 75–108)
POTASSIUM SERPL-SCNC: 4.9 MMOL/L (ref 3.5–5.1)
PROT SERPL-MCNC: 6.9 G/DL (ref 6.4–8.2)
RBC # BLD AUTO: 4.98 M/UL (ref 4.2–5.9)
REPORT: NORMAL
RESP PATH DNA+RNA PNL NPH NAA+NON-PROBE: ABNORMAL
SAO2 % BLDA: 96.9 %
SODIUM SERPL-SCNC: 141 MMOL/L (ref 136–145)
WBC # BLD AUTO: 13.2 K/UL (ref 4–11)

## 2023-12-31 PROCEDURE — 94640 AIRWAY INHALATION TREATMENT: CPT

## 2023-12-31 PROCEDURE — 6370000000 HC RX 637 (ALT 250 FOR IP): Performed by: FAMILY MEDICINE

## 2023-12-31 PROCEDURE — 6370000000 HC RX 637 (ALT 250 FOR IP): Performed by: NURSE PRACTITIONER

## 2023-12-31 PROCEDURE — 2700000000 HC OXYGEN THERAPY PER DAY

## 2023-12-31 PROCEDURE — 2060000000 HC ICU INTERMEDIATE R&B

## 2023-12-31 PROCEDURE — 36600 WITHDRAWAL OF ARTERIAL BLOOD: CPT

## 2023-12-31 PROCEDURE — 82803 BLOOD GASES ANY COMBINATION: CPT

## 2023-12-31 PROCEDURE — 80053 COMPREHEN METABOLIC PANEL: CPT

## 2023-12-31 PROCEDURE — 85025 COMPLETE CBC W/AUTO DIFF WBC: CPT

## 2023-12-31 PROCEDURE — 94760 N-INVAS EAR/PLS OXIMETRY 1: CPT

## 2023-12-31 PROCEDURE — 0202U NFCT DS 22 TRGT SARS-COV-2: CPT

## 2023-12-31 PROCEDURE — 94761 N-INVAS EAR/PLS OXIMETRY MLT: CPT

## 2023-12-31 PROCEDURE — 6360000002 HC RX W HCPCS: Performed by: FAMILY MEDICINE

## 2023-12-31 PROCEDURE — 36415 COLL VENOUS BLD VENIPUNCTURE: CPT

## 2023-12-31 PROCEDURE — 99232 SBSQ HOSP IP/OBS MODERATE 35: CPT | Performed by: INTERNAL MEDICINE

## 2023-12-31 PROCEDURE — 2580000003 HC RX 258: Performed by: FAMILY MEDICINE

## 2023-12-31 RX ORDER — IPRATROPIUM BROMIDE AND ALBUTEROL SULFATE 2.5; .5 MG/3ML; MG/3ML
1 SOLUTION RESPIRATORY (INHALATION)
Status: DISCONTINUED | OUTPATIENT
Start: 2023-12-31 | End: 2024-01-01

## 2023-12-31 RX ADMIN — OXYCODONE AND ACETAMINOPHEN 1 TABLET: 5; 325 TABLET ORAL at 22:31

## 2023-12-31 RX ADMIN — IPRATROPIUM BROMIDE AND ALBUTEROL SULFATE 1 DOSE: .5; 3 SOLUTION RESPIRATORY (INHALATION) at 08:19

## 2023-12-31 RX ADMIN — IPRATROPIUM BROMIDE AND ALBUTEROL SULFATE 1 DOSE: .5; 3 SOLUTION RESPIRATORY (INHALATION) at 20:16

## 2023-12-31 RX ADMIN — SODIUM CHLORIDE, PRESERVATIVE FREE 5 ML: 5 INJECTION INTRAVENOUS at 22:39

## 2023-12-31 RX ADMIN — LOPERAMIDE HYDROCHLORIDE 2 MG: 2 CAPSULE ORAL at 09:20

## 2023-12-31 RX ADMIN — CARVEDILOL 12.5 MG: 12.5 TABLET, FILM COATED ORAL at 09:14

## 2023-12-31 RX ADMIN — OXYCODONE AND ACETAMINOPHEN 1 TABLET: 5; 325 TABLET ORAL at 09:21

## 2023-12-31 RX ADMIN — MOMETASONE FUROATE AND FORMOTEROL FUMARATE DIHYDRATE 2 PUFF: 200; 5 AEROSOL RESPIRATORY (INHALATION) at 08:19

## 2023-12-31 RX ADMIN — METHYLPREDNISOLONE SODIUM SUCCINATE 40 MG: 40 INJECTION INTRAMUSCULAR; INTRAVENOUS at 03:05

## 2023-12-31 RX ADMIN — GUAIFENESIN SYRUP AND DEXTROMETHORPHAN 5 ML: 100; 10 SYRUP ORAL at 09:21

## 2023-12-31 RX ADMIN — MOMETASONE FUROATE AND FORMOTEROL FUMARATE DIHYDRATE 2 PUFF: 200; 5 AEROSOL RESPIRATORY (INHALATION) at 20:16

## 2023-12-31 RX ADMIN — LOPERAMIDE HYDROCHLORIDE 2 MG: 2 CAPSULE ORAL at 16:55

## 2023-12-31 RX ADMIN — SACUBITRIL AND VALSARTAN 1 TABLET: 24; 26 TABLET, FILM COATED ORAL at 09:14

## 2023-12-31 RX ADMIN — AZITHROMYCIN 500 MG: 500 TABLET, FILM COATED ORAL at 09:14

## 2023-12-31 RX ADMIN — CARVEDILOL 12.5 MG: 12.5 TABLET, FILM COATED ORAL at 16:55

## 2023-12-31 RX ADMIN — SODIUM CHLORIDE, PRESERVATIVE FREE 10 ML: 5 INJECTION INTRAVENOUS at 09:22

## 2023-12-31 RX ADMIN — PREDNISONE 40 MG: 20 TABLET ORAL at 09:14

## 2023-12-31 RX ADMIN — IPRATROPIUM BROMIDE AND ALBUTEROL SULFATE 1 DOSE: .5; 3 SOLUTION RESPIRATORY (INHALATION) at 16:21

## 2023-12-31 RX ADMIN — OXYCODONE AND ACETAMINOPHEN 1 TABLET: 5; 325 TABLET ORAL at 16:55

## 2023-12-31 RX ADMIN — LOPERAMIDE HYDROCHLORIDE 2 MG: 2 CAPSULE ORAL at 22:49

## 2023-12-31 RX ADMIN — GUAIFENESIN SYRUP AND DEXTROMETHORPHAN 5 ML: 100; 10 SYRUP ORAL at 20:50

## 2023-12-31 RX ADMIN — SACUBITRIL AND VALSARTAN 1 TABLET: 24; 26 TABLET, FILM COATED ORAL at 22:31

## 2023-12-31 RX ADMIN — IPRATROPIUM BROMIDE AND ALBUTEROL SULFATE 1 DOSE: .5; 3 SOLUTION RESPIRATORY (INHALATION) at 12:32

## 2023-12-31 RX ADMIN — RIVAROXABAN 20 MG: 20 TABLET, FILM COATED ORAL at 09:14

## 2023-12-31 RX ADMIN — INSULIN LISPRO 2 UNITS: 100 INJECTION, SOLUTION INTRAVENOUS; SUBCUTANEOUS at 09:20

## 2023-12-31 RX ADMIN — FUROSEMIDE 40 MG: 40 TABLET ORAL at 09:14

## 2023-12-31 ASSESSMENT — PAIN SCALES - GENERAL
PAINLEVEL_OUTOF10: 2
PAINLEVEL_OUTOF10: 6
PAINLEVEL_OUTOF10: 0
PAINLEVEL_OUTOF10: 0
PAINLEVEL_OUTOF10: 5
PAINLEVEL_OUTOF10: 0
PAINLEVEL_OUTOF10: 3
PAINLEVEL_OUTOF10: 6

## 2023-12-31 ASSESSMENT — PAIN - FUNCTIONAL ASSESSMENT
PAIN_FUNCTIONAL_ASSESSMENT: PREVENTS OR INTERFERES SOME ACTIVE ACTIVITIES AND ADLS
PAIN_FUNCTIONAL_ASSESSMENT: ACTIVITIES ARE NOT PREVENTED
PAIN_FUNCTIONAL_ASSESSMENT: ACTIVITIES ARE NOT PREVENTED

## 2023-12-31 ASSESSMENT — PAIN DESCRIPTION - LOCATION
LOCATION: BACK
LOCATION: GENERALIZED
LOCATION: GENERALIZED

## 2023-12-31 ASSESSMENT — PAIN DESCRIPTION - FREQUENCY
FREQUENCY: CONTINUOUS
FREQUENCY: CONTINUOUS

## 2023-12-31 ASSESSMENT — PAIN DESCRIPTION - DESCRIPTORS
DESCRIPTORS: ACHING
DESCRIPTORS: ACHING;SORE
DESCRIPTORS: ACHING;SORE

## 2023-12-31 ASSESSMENT — PAIN DESCRIPTION - ONSET
ONSET: ON-GOING
ONSET: ON-GOING

## 2023-12-31 ASSESSMENT — PAIN DESCRIPTION - ORIENTATION: ORIENTATION: OUTER

## 2023-12-31 ASSESSMENT — PAIN DESCRIPTION - PAIN TYPE
TYPE: CHRONIC PAIN
TYPE: CHRONIC PAIN

## 2023-12-31 ASSESSMENT — PAIN SCALES - WONG BAKER: WONGBAKER_NUMERICALRESPONSE: 0

## 2023-12-31 NOTE — PROGRESS NOTES
Overnight NIV therapy refused. Overnight HHN therapy also refused. Pt aware that he may call for either if he changes his mind.    Electronically signed by Jonathon Wyatt RCP on 12/31/2023 at 12:00 AM

## 2023-12-31 NOTE — PROGRESS NOTES
Pulmonary Critical Care Progress Note     Patient's name:  Mark Jorge  Medical Record Number: [id number]  Patient's account/billing number: [id number]  Patient's YOB: 1950  Age: 73 y.o.  Date of Admission: 12/29/2023  1:22 AM  Date of Consult: 12/30/2023      Primary Care Physician: Ella Velasquez APRN - CNP      Code Status: Full Code    Chief complaint: acute on chronic hypercapnic respiratory failure    Assessment and Plan     Acute on chronic hypercapnic respiratory failure  COPD with acute exacerbation  Tobacco abuse    Plan:  BiPAP nightly, will average home unit  Systemic steroid  Bronchodilators  Azithromycin  Advised to quit smoking  On anticoagulation        Overnight:  Complaining of worsening shortness of using    REVIEW OF SYSTEMS:  Review of Systems -   General ROS: negative  Psychological ROS: negative  Ophthalmic ROS: negative  ENT ROS: negative  Allergy and Immunology ROS: negative  Hematological and Lymphatic ROS: negative  Endocrine ROS: negative  Breast ROS: negative  Respiratory ROS: Cough, wheezing, shortness of breath  Cardiovascular ROS: no chest pain or dyspnea on exertion  Gastrointestinal ROS:negative  Genito-Urinary ROS: negative  Musculoskeletal ROS: negative  Neurological ROS: negative  Dermatological ROS: negative        Physical Exam:    Vitals: /71   Pulse 70   Temp 97.7 °F (36.5 °C) (Oral)   Resp 24   Ht 1.803 m (5' 11\")   Wt 80.5 kg (177 lb 7.5 oz)   SpO2 94%   BMI 24.75 kg/m²     Last Body weight:   Wt Readings from Last 3 Encounters:   12/30/23 80.5 kg (177 lb 7.5 oz)   06/26/21 87.4 kg (192 lb 10.9 oz)   04/19/21 84.4 kg (186 lb)       Body Mass Index : Body mass index is 24.75 kg/m².      Intake and Output summary:   Intake/Output Summary (Last 24 hours) at 12/30/2023 1926  Last data filed at 12/30/2023 0946  Gross per 24 hour   Intake 480 ml   Output 825 ml   Net -345 ml       Physical Examination:     Gen: mild acute  distress  Eyes: PERRL. Anicteric sclera. No conjunctival injection.   ENT: No discharge. Posterior oropharynx clear. External appearance of ears and nose normal.  Neck: Trachea midline. No mass   Resp:  severely diminished with wheezing   CV: Regular rate. Regular rhythm. No murmur or rub. No edema.   GI: Soft, Non-tender. Non-distended. +BS  Skin: Warm, dry, w/o erythema.   Lymph: No cervical or supraclavicular LAD.   M/S: No cyanosis. No clubbing.    Neuro:  CN 2-12 tested, no focal neurologic deficit.  Moves all extremities  Psych: Awake and alert, Oriented x 3.  Judgement and insight appropriate.  Mood stable.        Laboratory findings:-    CBC:   Recent Labs     12/30/23  0600   WBC 10.1   HGB 16.3        BMP:    Recent Labs     12/29/23  0131 12/29/23  0808 12/30/23  0600    138 136   K 4.9 4.9 4.8   CL 97* 100 97*   CO2 24 28 31   BUN 10 13 23*   CREATININE 1.0 0.9 0.8   GLUCOSE 183* 187* 185*     S. Calcium:  Recent Labs     12/30/23  0600   CALCIUM 9.0       S. Glucose:  Recent Labs     12/30/23  0747 12/30/23  1239 12/30/23  1723   POCGLU 203* 168* 225*           Radiology Review:  Pertinent images / reports were reviewed as a part of this visit.          Jose Raul Shore MD, M.D.            12/30/2023, 7:26 PM

## 2023-12-31 NOTE — PROGRESS NOTES
Patient VSS, he does have dyspnea on exertion. He wears 2L NC for comfort. Pain controlled, safety precautions in place. Will continue to monitor

## 2023-12-31 NOTE — PROGRESS NOTES
Hospitalist Progress Note      PCP: Ella Velasquez, APRN - CNP    Date of Admission: 12/29/2023    Chief Complaint: shorteness of breath    Hospital Course: Mark Jorge is a 73 y.o. male with a history including but not limited to COPD (still smokes), HFrEF (based on review of prior ECHO), prior DVT (on xarelto chronically), and HTN who presented via EMS where he was found to be grayish in appearance and extreme short of breath with notable hypoxia.  Patient initially placed on nonrebreather and transferred to CPAP en route.  He was given Solu-Medrol en route.  Transition to BiPAP in the ED.  Significantly improved after initial nebulizer treatments and BiPAP in the ED.  Initial lactic acidosis profoundly elevated.  Patient has not been having a fever.  He does have a hoarse cough.  Endorses wheezing.  No peripheral edema.  No orthopnea.  No chest pain or pleurisy.  No productive cough.  No recent sick contacts.  Admitted for copd exacerbation.     Subjective: Patient sitting on side of bed, he refuses BiPAP overnight and again breathing treatments this morning.  He states he does not understand the need for them.  I explained all of this yesterday, and reexplained it again today.  He states he is agreeable to take the DuoNebs and wear the BiPAP.  He is also agreeable to BiPAP at home.  Discussed with social work, on how to arrange BiPAP.  Order nocturnal pulse ox study and ABG for tonight.  He has been weaned down to room air at rest.  States he still gets short of breath walking back from the bathroom and is not at his baseline.  States \"I am hesitant to go home as I feel I may worsened quickly\".    Assessment/Plan:    Acute exacerbation COPD  -pulm consulted  -continue duonebs, dullera, prednisone  -o2 as needed  -bipap, patient refused to wear this last night.  Long discussion had with him today regarding the need for this and that pulmonology recommends that for home use.  Patient is agreeable to

## 2024-01-01 LAB
ANION GAP SERPL CALCULATED.3IONS-SCNC: 7 MMOL/L (ref 3–16)
ANION GAP SERPL CALCULATED.3IONS-SCNC: 9 MMOL/L (ref 3–16)
BASOPHILS # BLD: 0 K/UL (ref 0–0.2)
BASOPHILS NFR BLD: 0.1 %
BUN SERPL-MCNC: 24 MG/DL (ref 7–20)
BUN SERPL-MCNC: 27 MG/DL (ref 7–20)
CALCIUM SERPL-MCNC: 9.2 MG/DL (ref 8.3–10.6)
CALCIUM SERPL-MCNC: 9.2 MG/DL (ref 8.3–10.6)
CHLORIDE SERPL-SCNC: 101 MMOL/L (ref 99–110)
CHLORIDE SERPL-SCNC: 96 MMOL/L (ref 99–110)
CO2 SERPL-SCNC: 33 MMOL/L (ref 21–32)
CO2 SERPL-SCNC: 34 MMOL/L (ref 21–32)
CREAT SERPL-MCNC: 0.8 MG/DL (ref 0.8–1.3)
CREAT SERPL-MCNC: 0.9 MG/DL (ref 0.8–1.3)
DEPRECATED RDW RBC AUTO: 13 % (ref 12.4–15.4)
EOSINOPHIL # BLD: 0 K/UL (ref 0–0.6)
EOSINOPHIL NFR BLD: 0 %
GFR SERPLBLD CREATININE-BSD FMLA CKD-EPI: >60 ML/MIN/{1.73_M2}
GFR SERPLBLD CREATININE-BSD FMLA CKD-EPI: >60 ML/MIN/{1.73_M2}
GLUCOSE BLD-MCNC: 154 MG/DL (ref 70–99)
GLUCOSE BLD-MCNC: 155 MG/DL (ref 70–99)
GLUCOSE BLD-MCNC: 191 MG/DL (ref 70–99)
GLUCOSE BLD-MCNC: 214 MG/DL (ref 70–99)
GLUCOSE SERPL-MCNC: 188 MG/DL (ref 70–99)
GLUCOSE SERPL-MCNC: 190 MG/DL (ref 70–99)
HCT VFR BLD AUTO: 47.2 % (ref 40.5–52.5)
HGB BLD-MCNC: 16 G/DL (ref 13.5–17.5)
LYMPHOCYTES # BLD: 0.7 K/UL (ref 1–5.1)
LYMPHOCYTES NFR BLD: 5.9 %
MCH RBC QN AUTO: 32.1 PG (ref 26–34)
MCHC RBC AUTO-ENTMCNC: 33.9 G/DL (ref 31–36)
MCV RBC AUTO: 94.8 FL (ref 80–100)
MONOCYTES # BLD: 1.1 K/UL (ref 0–1.3)
MONOCYTES NFR BLD: 9.7 %
NEUTROPHILS # BLD: 9.3 K/UL (ref 1.7–7.7)
NEUTROPHILS NFR BLD: 84.3 %
PERFORMED ON: ABNORMAL
PLATELET # BLD AUTO: 222 K/UL (ref 135–450)
PMV BLD AUTO: 8.3 FL (ref 5–10.5)
POTASSIUM SERPL-SCNC: 4.8 MMOL/L (ref 3.5–5.1)
POTASSIUM SERPL-SCNC: 5.7 MMOL/L (ref 3.5–5.1)
RBC # BLD AUTO: 4.98 M/UL (ref 4.2–5.9)
S PYO AG THROAT QL: NEGATIVE
SODIUM SERPL-SCNC: 138 MMOL/L (ref 136–145)
SODIUM SERPL-SCNC: 142 MMOL/L (ref 136–145)
WBC # BLD AUTO: 11 K/UL (ref 4–11)

## 2024-01-01 PROCEDURE — 2580000003 HC RX 258: Performed by: FAMILY MEDICINE

## 2024-01-01 PROCEDURE — 80048 BASIC METABOLIC PNL TOTAL CA: CPT

## 2024-01-01 PROCEDURE — 94761 N-INVAS EAR/PLS OXIMETRY MLT: CPT

## 2024-01-01 PROCEDURE — 87081 CULTURE SCREEN ONLY: CPT

## 2024-01-01 PROCEDURE — 36415 COLL VENOUS BLD VENIPUNCTURE: CPT

## 2024-01-01 PROCEDURE — 6370000000 HC RX 637 (ALT 250 FOR IP): Performed by: FAMILY MEDICINE

## 2024-01-01 PROCEDURE — 2700000000 HC OXYGEN THERAPY PER DAY

## 2024-01-01 PROCEDURE — 6370000000 HC RX 637 (ALT 250 FOR IP): Performed by: NURSE PRACTITIONER

## 2024-01-01 PROCEDURE — 85025 COMPLETE CBC W/AUTO DIFF WBC: CPT

## 2024-01-01 PROCEDURE — 99232 SBSQ HOSP IP/OBS MODERATE 35: CPT | Performed by: INTERNAL MEDICINE

## 2024-01-01 PROCEDURE — 94640 AIRWAY INHALATION TREATMENT: CPT

## 2024-01-01 PROCEDURE — 87880 STREP A ASSAY W/OPTIC: CPT

## 2024-01-01 PROCEDURE — 2060000000 HC ICU INTERMEDIATE R&B

## 2024-01-01 RX ORDER — ALBUTEROL SULFATE 90 UG/1
2 AEROSOL, METERED RESPIRATORY (INHALATION)
Status: DISCONTINUED | OUTPATIENT
Start: 2024-01-01 | End: 2024-01-03 | Stop reason: HOSPADM

## 2024-01-01 RX ADMIN — SACUBITRIL AND VALSARTAN 1 TABLET: 24; 26 TABLET, FILM COATED ORAL at 08:00

## 2024-01-01 RX ADMIN — FUROSEMIDE 40 MG: 40 TABLET ORAL at 08:00

## 2024-01-01 RX ADMIN — PREDNISONE 40 MG: 20 TABLET ORAL at 08:00

## 2024-01-01 RX ADMIN — OXYCODONE AND ACETAMINOPHEN 1 TABLET: 5; 325 TABLET ORAL at 15:50

## 2024-01-01 RX ADMIN — LOPERAMIDE HYDROCHLORIDE 2 MG: 2 CAPSULE ORAL at 08:00

## 2024-01-01 RX ADMIN — ALBUTEROL SULFATE 2 PUFF: 90 AEROSOL, METERED RESPIRATORY (INHALATION) at 15:58

## 2024-01-01 RX ADMIN — OXYCODONE AND ACETAMINOPHEN 1 TABLET: 5; 325 TABLET ORAL at 22:42

## 2024-01-01 RX ADMIN — CARVEDILOL 12.5 MG: 12.5 TABLET, FILM COATED ORAL at 08:00

## 2024-01-01 RX ADMIN — MOMETASONE FUROATE AND FORMOTEROL FUMARATE DIHYDRATE 2 PUFF: 200; 5 AEROSOL RESPIRATORY (INHALATION) at 20:14

## 2024-01-01 RX ADMIN — SODIUM CHLORIDE, PRESERVATIVE FREE 10 ML: 5 INJECTION INTRAVENOUS at 08:02

## 2024-01-01 RX ADMIN — RIVAROXABAN 20 MG: 20 TABLET, FILM COATED ORAL at 08:00

## 2024-01-01 RX ADMIN — ALBUTEROL SULFATE 2 PUFF: 90 AEROSOL, METERED RESPIRATORY (INHALATION) at 20:14

## 2024-01-01 RX ADMIN — IPRATROPIUM BROMIDE AND ALBUTEROL SULFATE 1 DOSE: .5; 3 SOLUTION RESPIRATORY (INHALATION) at 08:38

## 2024-01-01 RX ADMIN — OXYCODONE AND ACETAMINOPHEN 1 TABLET: 5; 325 TABLET ORAL at 08:00

## 2024-01-01 RX ADMIN — AZITHROMYCIN 500 MG: 500 TABLET, FILM COATED ORAL at 08:00

## 2024-01-01 RX ADMIN — SACUBITRIL AND VALSARTAN 1 TABLET: 24; 26 TABLET, FILM COATED ORAL at 20:45

## 2024-01-01 RX ADMIN — MOMETASONE FUROATE AND FORMOTEROL FUMARATE DIHYDRATE 2 PUFF: 200; 5 AEROSOL RESPIRATORY (INHALATION) at 08:38

## 2024-01-01 RX ADMIN — INSULIN LISPRO 2 UNITS: 100 INJECTION, SOLUTION INTRAVENOUS; SUBCUTANEOUS at 13:05

## 2024-01-01 RX ADMIN — CARVEDILOL 12.5 MG: 12.5 TABLET, FILM COATED ORAL at 17:46

## 2024-01-01 RX ADMIN — SODIUM CHLORIDE, PRESERVATIVE FREE 10 ML: 5 INJECTION INTRAVENOUS at 20:46

## 2024-01-01 ASSESSMENT — PAIN DESCRIPTION - LOCATION
LOCATION: BACK
LOCATION: BACK;THROAT

## 2024-01-01 ASSESSMENT — PAIN SCALES - GENERAL
PAINLEVEL_OUTOF10: 3
PAINLEVEL_OUTOF10: 5
PAINLEVEL_OUTOF10: 0
PAINLEVEL_OUTOF10: 3
PAINLEVEL_OUTOF10: 6
PAINLEVEL_OUTOF10: 4

## 2024-01-01 NOTE — PROGRESS NOTES
Patient completed a warm salt water gargle and states that this helped his throat pain a lot. Patient requesting to this again right before 7 pm.

## 2024-01-01 NOTE — PROGRESS NOTES
[facility]  Respiratory Therapy  Arterial Blood Gas    Name:  Mark Jorge  Medical Record Number:  [id number]  Age: 73 y.o.   : 1950  Today's Date:  2023  Room:  R3V-5275/5260-01      Assessment      /82   Pulse 72   Temp 97.5 °F (36.4 °C) (Oral)   Resp 18   Ht 1.803 m (5' 11\")   Wt 79.6 kg (175 lb 8 oz)   SpO2 97%   BMI 24.48 kg/m²     Patient Active Problem List   Diagnosis    Lower abdominal pain    Hematuria    HTN (hypertension)    Type 2 diabetes mellitus without complication (HCC)    Constipation    Acute encephalopathy    Septic shock (HCC)    Lactic acidosis    Perforated viscus    Peritonitis, acute generalized (HCC)    SHARONDA (acute kidney injury) (HCC)    Thrombocytopenia (HCC)    Hypernatremia    Leukocytosis    Hyperglycemia    Fecal peritonitis (HCC)    VF (ventricular fibrillation) (HCC)    Cardiomyopathy (HCC)    MRSA (methicillin resistant Staphylococcus aureus) infection    Colostomy in place (HCC)    Tobacco abuse    Perforated sigmoid colon (HCC)    Incisional hernia without obstruction or gangrene    Acute on chronic systolic congestive heart failure (HCC)    Acute respiratory acidosis (HCC)    Acute respiratory failure with hypoxia and hypercarbia (HCC)    Acute pulmonary edema (HCC)    Pleural effusion    Hyperlipidemia    A-fib (HCC)    Centrilobular emphysema (HCC)    Acute congestive heart failure (HCC)    H/O ventral hernia    History of ventral hernia    Acute respiratory failure (HCC)    COPD exacerbation (HCC)    Pain of upper abdomen    Left inguinal hernia    Acute respiratory failure with hypoxia (HCC)    Partial small bowel obstruction (HCC)    Acute exacerbation of chronic obstructive pulmonary disease (COPD) (HCC)    Chronic HFrEF (heart failure with reduced ejection fraction) (HCC)    SIRS (systemic inflammatory response syndrome) (HCC)       Social History  Social History     Tobacco Use    Smoking status: Every Day     Current

## 2024-01-01 NOTE — PLAN OF CARE
Problem: Safety - Adult  Goal: Free from fall injury  Outcome: Progressing   Fall risk assessment completed per unit protocol. Patient's bed is in the lowest position, call light is within reach and the patient's room is free of clutter. The patient has been instructed to call for assistance before getting out of bed or the chair.     Problem: Respiratory - Adult  Goal: Achieves optimal ventilation and oxygenation  Outcome: Progressing   Patient is able to breathe comfortably on 2L of oxygen per nasal cannula which is his baseline oxygen     Problem: Pain  Goal: Verbalizes/displays adequate comfort level or baseline comfort level  Outcome: Progressing  Flowsheets (Taken 12/31/2023 2231 by Sharmaine Pyle RN)  Verbalizes/displays adequate comfort level or baseline comfort level: Encourage patient to monitor pain and request assistance   Pain/discomfort being managed with PRN analgesics per MD orders. Pt able to express presence and absence of pain and rate pain appropriately using numerical scale.

## 2024-01-01 NOTE — PROGRESS NOTES
Pulmonary Critical Care Progress Note     Patient's name:  Mark Jorge  Medical Record Number: [id number]  Patient's account/billing number: [id number]  Patient's YOB: 1950  Age: 73 y.o.  Date of Admission: 12/29/2023  1:22 AM  Date of Consult: 1/1/2024      Primary Care Physician: Ella Velasquez APRN - CNP      Code Status: Full Code    Chief complaint: acute on chronic hypercapnic respiratory failure    Assessment and Plan     Acute on chronic hypercapnic respiratory failure  COPD with acute exacerbation  Tobacco abuse    Plan:  BiPAP nightly, will average home unit  Systemic steroid  Bronchodilators  Azithromycin  Advised to quit smoking  On anticoagulation        Overnight:  Complaining of worsening shortness of using    REVIEW OF SYSTEMS:  Review of Systems -   General ROS: negative  Psychological ROS: negative  Ophthalmic ROS: negative  ENT ROS: negative  Allergy and Immunology ROS: negative  Hematological and Lymphatic ROS: negative  Endocrine ROS: negative  Breast ROS: negative  Respiratory ROS: Cough, wheezing, shortness of breath  Cardiovascular ROS: no chest pain or dyspnea on exertion  Gastrointestinal ROS:negative  Genito-Urinary ROS: negative  Musculoskeletal ROS: negative  Neurological ROS: negative  Dermatological ROS: negative        Physical Exam:    Vitals: /85   Pulse 73   Temp 97.8 °F (36.6 °C) (Oral)   Resp 18   Ht 1.803 m (5' 11\")   Wt 79.1 kg (174 lb 6.1 oz)   SpO2 95%   BMI 24.32 kg/m²     Last Body weight:   Wt Readings from Last 3 Encounters:   01/01/24 79.1 kg (174 lb 6.1 oz)   06/26/21 87.4 kg (192 lb 10.9 oz)   04/19/21 84.4 kg (186 lb)       Body Mass Index : Body mass index is 24.32 kg/m².      Intake and Output summary:   Intake/Output Summary (Last 24 hours) at 1/1/2024 1915  Last data filed at 1/1/2024 1350  Gross per 24 hour   Intake 1080 ml   Output 850 ml   Net 230 ml         Physical Examination:     Gen: mild acute distress  Eyes:

## 2024-01-01 NOTE — PROGRESS NOTES
Hospitalist Progress Note      PCP: Ella Velasquez, APRN - CNP    Date of Admission: 12/29/2023    Chief Complaint: shorteness of breath    Hospital Course: Mark Jorge is a 73 y.o. male with a history including but not limited to COPD (still smokes), HFrEF (based on review of prior ECHO), prior DVT (on xarelto chronically), and HTN who presented via EMS where he was found to be grayish in appearance and extreme short of breath with notable hypoxia.  Patient initially placed on nonrebreather and transferred to CPAP en route.  He was given Solu-Medrol en route.  Transition to BiPAP in the ED.  Significantly improved after initial nebulizer treatments and BiPAP in the ED.  Initial lactic acidosis profoundly elevated.  Patient has not been having a fever.  He does have a hoarse cough.  Endorses wheezing.  No peripheral edema.  No orthopnea.  No chest pain or pleurisy.  No productive cough.  No recent sick contacts.  Admitted for copd exacerbation.     Subjective: Pt sitting on side of bed, wants to constantly debate that the duonebs are not helpful and \"you guys are just trying to sell something.\" Pt insisted numerous times that I was selling him the \"smoke medicine\" and feels it does not help him since it makes him cough. I had a long discussion with patient yesterday regarding the pharmokinetics of the medications he was on, and today he wanted to argue that the medications are not for copd and his problem isn't copd, its only his throat. Pt is convinced that his throat was \"swollen shut\" and that's why he couldn't breath on admission. Again explained to pt his throat is not swollen shut. Offered warm salt water rinses and strep swab, pt agreed. Pt states he is going to refuse breathing treatments from now on. After 20 min discussion with patient about medications and treatment plan again today, he continued to want to argue with me about the medications. I excused myself from his room.

## 2024-01-01 NOTE — PROGRESS NOTES
Pulmonary Critical Care Progress Note     Patient's name:  Mark Jorge  Medical Record Number: [id number]  Patient's account/billing number: [id number]  Patient's YOB: 1950  Age: 73 y.o.  Date of Admission: 12/29/2023  1:22 AM  Date of Consult: 12/31/2023      Primary Care Physician: Ella Velasquez APRN - CNP      Code Status: Full Code    Chief complaint: acute on chronic hypercapnic respiratory failure    Assessment and Plan     Acute on chronic hypercapnic respiratory failure  COPD with acute exacerbation  Tobacco abuse    Plan:  BiPAP nightly, will average home unit  Systemic steroid  Bronchodilators  Azithromycin  Advised to quit smoking  On anticoagulation        Overnight:  Complaining of worsening shortness of using    REVIEW OF SYSTEMS:  Review of Systems -   General ROS: negative  Psychological ROS: negative  Ophthalmic ROS: negative  ENT ROS: negative  Allergy and Immunology ROS: negative  Hematological and Lymphatic ROS: negative  Endocrine ROS: negative  Breast ROS: negative  Respiratory ROS: Cough, wheezing, shortness of breath  Cardiovascular ROS: no chest pain or dyspnea on exertion  Gastrointestinal ROS:negative  Genito-Urinary ROS: negative  Musculoskeletal ROS: negative  Neurological ROS: negative  Dermatological ROS: negative        Physical Exam:    Vitals: /82   Pulse 72   Temp 97.5 °F (36.4 °C) (Oral)   Resp 18   Ht 1.803 m (5' 11\")   Wt 79.6 kg (175 lb 8 oz)   SpO2 97%   BMI 24.48 kg/m²     Last Body weight:   Wt Readings from Last 3 Encounters:   12/31/23 79.6 kg (175 lb 8 oz)   06/26/21 87.4 kg (192 lb 10.9 oz)   04/19/21 84.4 kg (186 lb)       Body Mass Index : Body mass index is 24.48 kg/m².      Intake and Output summary:   Intake/Output Summary (Last 24 hours) at 12/31/2023 1901  Last data filed at 12/31/2023 1859  Gross per 24 hour   Intake 840 ml   Output --   Net 840 ml         Physical Examination:     Gen: mild acute distress  Eyes:

## 2024-01-02 LAB
ANION GAP SERPL CALCULATED.3IONS-SCNC: 8 MMOL/L (ref 3–16)
BACTERIA BLD CULT ORG #2: NORMAL
BACTERIA BLD CULT: NORMAL
BASOPHILS # BLD: 0 K/UL (ref 0–0.2)
BASOPHILS NFR BLD: 0.1 %
BUN SERPL-MCNC: 27 MG/DL (ref 7–20)
CALCIUM SERPL-MCNC: 8.8 MG/DL (ref 8.3–10.6)
CHLORIDE SERPL-SCNC: 100 MMOL/L (ref 99–110)
CO2 SERPL-SCNC: 31 MMOL/L (ref 21–32)
CREAT SERPL-MCNC: 0.8 MG/DL (ref 0.8–1.3)
DEPRECATED RDW RBC AUTO: 13.1 % (ref 12.4–15.4)
EOSINOPHIL # BLD: 0 K/UL (ref 0–0.6)
EOSINOPHIL NFR BLD: 0 %
GFR SERPLBLD CREATININE-BSD FMLA CKD-EPI: >60 ML/MIN/{1.73_M2}
GLUCOSE BLD-MCNC: 128 MG/DL (ref 70–99)
GLUCOSE BLD-MCNC: 154 MG/DL (ref 70–99)
GLUCOSE BLD-MCNC: 240 MG/DL (ref 70–99)
GLUCOSE BLD-MCNC: 252 MG/DL (ref 70–99)
GLUCOSE BLD-MCNC: 345 MG/DL (ref 70–99)
GLUCOSE BLD-MCNC: 359 MG/DL (ref 70–99)
GLUCOSE BLD-MCNC: 402 MG/DL (ref 70–99)
GLUCOSE SERPL-MCNC: 142 MG/DL (ref 70–99)
HCT VFR BLD AUTO: 51.6 % (ref 40.5–52.5)
HGB BLD-MCNC: 17.1 G/DL (ref 13.5–17.5)
LYMPHOCYTES # BLD: 1.3 K/UL (ref 1–5.1)
LYMPHOCYTES NFR BLD: 16 %
MCH RBC QN AUTO: 31.8 PG (ref 26–34)
MCHC RBC AUTO-ENTMCNC: 33.2 G/DL (ref 31–36)
MCV RBC AUTO: 95.9 FL (ref 80–100)
MONOCYTES # BLD: 1.4 K/UL (ref 0–1.3)
MONOCYTES NFR BLD: 16.5 %
NEUTROPHILS # BLD: 5.7 K/UL (ref 1.7–7.7)
NEUTROPHILS NFR BLD: 67.4 %
PERFORMED ON: ABNORMAL
PLATELET # BLD AUTO: 216 K/UL (ref 135–450)
PMV BLD AUTO: 8.2 FL (ref 5–10.5)
POTASSIUM SERPL-SCNC: 4.4 MMOL/L (ref 3.5–5.1)
RBC # BLD AUTO: 5.38 M/UL (ref 4.2–5.9)
SODIUM SERPL-SCNC: 139 MMOL/L (ref 136–145)
WBC # BLD AUTO: 8.4 K/UL (ref 4–11)

## 2024-01-02 PROCEDURE — 94762 N-INVAS EAR/PLS OXIMTRY CONT: CPT

## 2024-01-02 PROCEDURE — 2060000000 HC ICU INTERMEDIATE R&B

## 2024-01-02 PROCEDURE — 6370000000 HC RX 637 (ALT 250 FOR IP): Performed by: NURSE PRACTITIONER

## 2024-01-02 PROCEDURE — 6370000000 HC RX 637 (ALT 250 FOR IP): Performed by: FAMILY MEDICINE

## 2024-01-02 PROCEDURE — 94761 N-INVAS EAR/PLS OXIMETRY MLT: CPT

## 2024-01-02 PROCEDURE — 2700000000 HC OXYGEN THERAPY PER DAY

## 2024-01-02 PROCEDURE — 94640 AIRWAY INHALATION TREATMENT: CPT

## 2024-01-02 PROCEDURE — 85025 COMPLETE CBC W/AUTO DIFF WBC: CPT

## 2024-01-02 PROCEDURE — 99232 SBSQ HOSP IP/OBS MODERATE 35: CPT | Performed by: INTERNAL MEDICINE

## 2024-01-02 PROCEDURE — 2580000003 HC RX 258: Performed by: FAMILY MEDICINE

## 2024-01-02 PROCEDURE — 36415 COLL VENOUS BLD VENIPUNCTURE: CPT

## 2024-01-02 PROCEDURE — 80048 BASIC METABOLIC PNL TOTAL CA: CPT

## 2024-01-02 RX ADMIN — INSULIN LISPRO 4 UNITS: 100 INJECTION, SOLUTION INTRAVENOUS; SUBCUTANEOUS at 20:06

## 2024-01-02 RX ADMIN — GUAIFENESIN SYRUP AND DEXTROMETHORPHAN 5 ML: 100; 10 SYRUP ORAL at 07:42

## 2024-01-02 RX ADMIN — FUROSEMIDE 40 MG: 40 TABLET ORAL at 07:35

## 2024-01-02 RX ADMIN — INSULIN LISPRO 8 UNITS: 100 INJECTION, SOLUTION INTRAVENOUS; SUBCUTANEOUS at 12:34

## 2024-01-02 RX ADMIN — OXYCODONE AND ACETAMINOPHEN 1 TABLET: 5; 325 TABLET ORAL at 16:37

## 2024-01-02 RX ADMIN — LOPERAMIDE HYDROCHLORIDE 2 MG: 2 CAPSULE ORAL at 16:37

## 2024-01-02 RX ADMIN — SACUBITRIL AND VALSARTAN 1 TABLET: 24; 26 TABLET, FILM COATED ORAL at 20:06

## 2024-01-02 RX ADMIN — RIVAROXABAN 20 MG: 20 TABLET, FILM COATED ORAL at 07:35

## 2024-01-02 RX ADMIN — ALBUTEROL SULFATE 2 PUFF: 90 AEROSOL, METERED RESPIRATORY (INHALATION) at 20:01

## 2024-01-02 RX ADMIN — OXYCODONE AND ACETAMINOPHEN 1 TABLET: 5; 325 TABLET ORAL at 22:49

## 2024-01-02 RX ADMIN — LOPERAMIDE HYDROCHLORIDE 2 MG: 2 CAPSULE ORAL at 11:39

## 2024-01-02 RX ADMIN — CARVEDILOL 12.5 MG: 12.5 TABLET, FILM COATED ORAL at 07:35

## 2024-01-02 RX ADMIN — SODIUM CHLORIDE, PRESERVATIVE FREE 10 ML: 5 INJECTION INTRAVENOUS at 20:06

## 2024-01-02 RX ADMIN — CARVEDILOL 12.5 MG: 12.5 TABLET, FILM COATED ORAL at 16:37

## 2024-01-02 RX ADMIN — SACUBITRIL AND VALSARTAN 1 TABLET: 24; 26 TABLET, FILM COATED ORAL at 07:35

## 2024-01-02 RX ADMIN — OXYCODONE AND ACETAMINOPHEN 1 TABLET: 5; 325 TABLET ORAL at 07:42

## 2024-01-02 RX ADMIN — PREDNISONE 40 MG: 20 TABLET ORAL at 07:35

## 2024-01-02 RX ADMIN — MOMETASONE FUROATE AND FORMOTEROL FUMARATE DIHYDRATE 2 PUFF: 200; 5 AEROSOL RESPIRATORY (INHALATION) at 20:01

## 2024-01-02 RX ADMIN — ALBUTEROL SULFATE 2 PUFF: 90 AEROSOL, METERED RESPIRATORY (INHALATION) at 12:01

## 2024-01-02 ASSESSMENT — PAIN SCALES - GENERAL
PAINLEVEL_OUTOF10: 0
PAINLEVEL_OUTOF10: 5
PAINLEVEL_OUTOF10: 3
PAINLEVEL_OUTOF10: 5

## 2024-01-02 ASSESSMENT — PAIN SCALES - WONG BAKER: WONGBAKER_NUMERICALRESPONSE: 0

## 2024-01-02 NOTE — PROGRESS NOTES
Hospitalist Progress Note  1/2/2024 9:30 AM    PCP: Ella Velasquez, ALISE - CNP    [id number]     Date of Admission: 12/29/2023                                                                                                                     HOSPITAL COURSE    Patient demographics:  The patient  Mark Jorge is a 73 y.o. male     Significant past medical history:   Patient Active Problem List   Diagnosis    Lower abdominal pain    Hematuria    HTN (hypertension)    Type 2 diabetes mellitus without complication (HCC)    Constipation    Acute encephalopathy    Septic shock (HCC)    Lactic acidosis    Perforated viscus    Peritonitis, acute generalized (HCC)    SHARONDA (acute kidney injury) (HCC)    Thrombocytopenia (HCC)    Hypernatremia    Leukocytosis    Hyperglycemia    Fecal peritonitis (HCC)    VF (ventricular fibrillation) (HCC)    Cardiomyopathy (HCC)    MRSA (methicillin resistant Staphylococcus aureus) infection    Colostomy in place (HCC)    Tobacco abuse    Perforated sigmoid colon (HCC)    Incisional hernia without obstruction or gangrene    Acute on chronic systolic congestive heart failure (HCC)    Acute respiratory acidosis (HCC)    Acute respiratory failure with hypoxia and hypercarbia (HCC)    Acute pulmonary edema (HCC)    Pleural effusion    Hyperlipidemia    A-fib (HCC)    Centrilobular emphysema (HCC)    Acute congestive heart failure (HCC)    H/O ventral hernia    History of ventral hernia    Acute respiratory failure (HCC)    COPD exacerbation (HCC)    Pain of upper abdomen    Left inguinal hernia    Acute respiratory failure with hypoxia (HCC)    Partial small bowel obstruction (HCC)    Acute exacerbation of chronic obstructive pulmonary disease (COPD) (HCC)    Chronic HFrEF (heart failure with reduced ejection fraction) (HCC)    SIRS (systemic inflammatory response syndrome) (HCC)         Presenting symptoms:      Diagnostic workup:      CONSULTS DURING ADMISSION :   PULMONARY REHAB        Exam:    Gen:   Alert and oriented ×3    Eyes: PERRL. No sclera icterus. No conjunctival injection.   ENT: No discharge. Pharynx clear. External appearance of ears and nose normal.  Neck: Trachea midline. No obvious mass.    Resp: No accessory muscle use. No crackles. No wheezes. No rhonchi.  CV: Regular rate. Regular rhythm. No murmur or rub. No edema.   GI: Non-tender. Non-distended. No hernia.   Skin: Warm, dry, normal texture and turgor.   Lymph: No cervical LAD. No supraclavicular LAD.   M/S: / Ext. No cyanosis. No clubbing. No joint deformity.    Neuro: CN 2-12 are intact,  no neurologic deficits noted.    PT/INR: No results for input(s): \"PROTIME\", \"INR\" in the last 72 hours.  APTT: No results for input(s): \"APTT\" in the last 72 hours.    CBC:   Recent Labs     12/31/23 0618 01/01/24  0533 01/02/24  0514   WBC 13.2* 11.0 8.4   HGB 15.7 16.0 17.1   HCT 47.0 47.2 51.6   MCV 94.5 94.8 95.9    222 216       BMP:   Recent Labs     12/31/23 0618 01/01/24  0533 01/01/24  1013 01/02/24  0514    142 138 139   K 4.9 5.7* 4.8 4.4    101 96* 100   CO2 31 34* 33* 31   BUN 24* 24* 27* 27*   CREATININE 0.9 0.9 0.8 0.8       LIVER PROFILE:   Recent Labs     12/31/23 0618   ALKPHOS 60   AST 30   ALT 22   BILITOT 0.7     No results for input(s): \"AMYLASE\" in the last 72 hours.  No results for input(s): \"LIPASE\" in the last 72 hours.    UA:No results for input(s): \"NITRITE\", \"LABCAST\", \"WBCUA\", \"RBCUA\", \"MUCUS\" in the last 72 hours.    TROPONIN: No results for input(s): \"CKTOTAL\", \"TROPONINI\" in the last 72 hours.    Lab Results   Component Value Date/Time    URRFLXCULT Not Indicated 12/29/2023 08:09 AM       No results for input(s): \"TSHREFLEX\" in the last 72 hours.    No components found for: \"LAF6196\"  POC GLUCOSE:    Recent Labs     01/01/24  0820 01/01/24  1201 01/01/24  1711 01/01/24 2026 01/02/24  0739   POCGLU 154* 214* 155* 191* 154*     No results for input(s): \"LABA1C\" in the last 72

## 2024-01-02 NOTE — PROGRESS NOTES
Pulmonary Critical Care Progress Note     Patient's name:  Mark Jorge  Medical Record Number: [id number]  Patient's account/billing number: [id number]  Patient's YOB: 1950  Age: 73 y.o.  Date of Admission: 12/29/2023  1:22 AM  Date of Consult: 1/2/2024      Primary Care Physician: Ella Velasquez APRN - CNP      Code Status: Full Code    Chief complaint: acute on chronic hypercapnic respiratory failure    Assessment and Plan     chronic hypercapnic respiratory failure  COPD with acute exacerbation  Tobacco abuse    Plan:  Bipap Qhs  Bronchodilators  Advised to quit smoking  On anticoagulation        Overnight:  Sob is better      REVIEW OF SYSTEMS:  Review of Systems -   General ROS: negative  Psychological ROS: negative  Ophthalmic ROS: negative  ENT ROS: negative  Allergy and Immunology ROS: negative  Hematological and Lymphatic ROS: negative  Endocrine ROS: negative  Breast ROS: negative  Respiratory ROS: Cough, wheezing, shortness of breath  Cardiovascular ROS: no chest pain or dyspnea on exertion  Gastrointestinal ROS:negative  Genito-Urinary ROS: negative  Musculoskeletal ROS: negative  Neurological ROS: negative  Dermatological ROS: negative        Physical Exam:    Vitals: /76   Pulse 75   Temp 97.6 °F (36.4 °C) (Oral)   Resp 16   Ht 1.803 m (5' 11\")   Wt 82.4 kg (181 lb 10.5 oz)   SpO2 96%   BMI 25.34 kg/m²     Last Body weight:   Wt Readings from Last 3 Encounters:   01/02/24 82.4 kg (181 lb 10.5 oz)   06/26/21 87.4 kg (192 lb 10.9 oz)   04/19/21 84.4 kg (186 lb)       Body Mass Index : Body mass index is 25.34 kg/m².      Intake and Output summary:   Intake/Output Summary (Last 24 hours) at 1/2/2024 1617  Last data filed at 1/2/2024 1427  Gross per 24 hour   Intake 960 ml   Output 50 ml   Net 910 ml         Physical Examination:     Gen: mild acute distress  Eyes: PERRL. Anicteric sclera. No conjunctival injection.   ENT: No discharge. Posterior oropharynx  clear. External appearance of ears and nose normal.  Neck: Trachea midline. No mass   Resp:  severely diminished with wheezing   CV: Regular rate. Regular rhythm. No murmur or rub. No edema.   GI: Soft, Non-tender. Non-distended. +BS  Skin: Warm, dry, w/o erythema.   Lymph: No cervical or supraclavicular LAD.   M/S: No cyanosis. No clubbing.    Neuro:  CN 2-12 tested, no focal neurologic deficit.  Moves all extremities  Psych: Awake and alert, Oriented x 3.  Judgement and insight appropriate.  Mood stable.        Laboratory findings:-    CBC:   Recent Labs     01/02/24  0514   WBC 8.4   HGB 17.1          BMP:    Recent Labs     01/01/24  0533 01/01/24  1013 01/02/24  0514    138 139   K 5.7* 4.8 4.4    96* 100   CO2 34* 33* 31   BUN 24* 27* 27*   CREATININE 0.9 0.8 0.8   GLUCOSE 188* 190* 142*       S. Calcium:  Recent Labs     01/02/24  0514   CALCIUM 8.8         S. Glucose:  Recent Labs     01/02/24  1220 01/02/24  1222 01/02/24  1225   POCGLU 345* 402* 359*             Radiology Review:  Pertinent images / reports were reviewed as a part of this visit.          Jose Raul Shore MD, M.D.            1/2/2024, 4:17 PM

## 2024-01-02 NOTE — CARE COORDINATION
Overnight pulse oximetry study was completed last night. Messaged MD to review results and place DME nocturnal oxygen orders if qualified. Dorothea Delacruz made aware of likely need for O2 at night.  Met with patient. Patient plans on returning home. Declines home care. Friend is able to transport home.     Electronically signed by Lashae Hall RN Case Management on 1/2/2024 at 3:16 PM

## 2024-01-02 NOTE — PROGRESS NOTES
Patient's morning assessment has been completed.  Patient is alert and oriented and vital signs are stable.  Patient voiced no complaints or concerns at this time.  Medications administered as ordered.  Patient's bed is in the lowest position and call light is within reach.  Will continue to monitor

## 2024-01-03 VITALS
SYSTOLIC BLOOD PRESSURE: 117 MMHG | BODY MASS INDEX: 24.75 KG/M2 | DIASTOLIC BLOOD PRESSURE: 82 MMHG | OXYGEN SATURATION: 97 % | RESPIRATION RATE: 18 BRPM | HEIGHT: 71 IN | TEMPERATURE: 97.4 F | WEIGHT: 176.81 LBS | HEART RATE: 71 BPM

## 2024-01-03 LAB
ANION GAP SERPL CALCULATED.3IONS-SCNC: 6 MMOL/L (ref 3–16)
BASOPHILS # BLD: 0 K/UL (ref 0–0.2)
BASOPHILS NFR BLD: 0.1 %
BUN SERPL-MCNC: 28 MG/DL (ref 7–20)
CALCIUM SERPL-MCNC: 8.8 MG/DL (ref 8.3–10.6)
CHLORIDE SERPL-SCNC: 102 MMOL/L (ref 99–110)
CO2 SERPL-SCNC: 35 MMOL/L (ref 21–32)
CREAT SERPL-MCNC: 0.9 MG/DL (ref 0.8–1.3)
DEPRECATED RDW RBC AUTO: 13.2 % (ref 12.4–15.4)
EOSINOPHIL # BLD: 0 K/UL (ref 0–0.6)
EOSINOPHIL NFR BLD: 0.3 %
GFR SERPLBLD CREATININE-BSD FMLA CKD-EPI: >60 ML/MIN/{1.73_M2}
GLUCOSE BLD-MCNC: 140 MG/DL (ref 70–99)
GLUCOSE SERPL-MCNC: 146 MG/DL (ref 70–99)
HCT VFR BLD AUTO: 49.4 % (ref 40.5–52.5)
HGB BLD-MCNC: 16.7 G/DL (ref 13.5–17.5)
LYMPHOCYTES # BLD: 1.7 K/UL (ref 1–5.1)
LYMPHOCYTES NFR BLD: 20.9 %
MCH RBC QN AUTO: 31.7 PG (ref 26–34)
MCHC RBC AUTO-ENTMCNC: 33.7 G/DL (ref 31–36)
MCV RBC AUTO: 93.9 FL (ref 80–100)
MONOCYTES # BLD: 1 K/UL (ref 0–1.3)
MONOCYTES NFR BLD: 12.4 %
NEUTROPHILS # BLD: 5.4 K/UL (ref 1.7–7.7)
NEUTROPHILS NFR BLD: 66.3 %
PERFORMED ON: ABNORMAL
PLATELET # BLD AUTO: 239 K/UL (ref 135–450)
PMV BLD AUTO: 8.4 FL (ref 5–10.5)
POTASSIUM SERPL-SCNC: 4.9 MMOL/L (ref 3.5–5.1)
RBC # BLD AUTO: 5.27 M/UL (ref 4.2–5.9)
S PYO THROAT QL CULT: NORMAL
SODIUM SERPL-SCNC: 143 MMOL/L (ref 136–145)
WBC # BLD AUTO: 8.2 K/UL (ref 4–11)

## 2024-01-03 PROCEDURE — 36415 COLL VENOUS BLD VENIPUNCTURE: CPT

## 2024-01-03 PROCEDURE — 99232 SBSQ HOSP IP/OBS MODERATE 35: CPT | Performed by: INTERNAL MEDICINE

## 2024-01-03 PROCEDURE — 2580000003 HC RX 258: Performed by: FAMILY MEDICINE

## 2024-01-03 PROCEDURE — 6370000000 HC RX 637 (ALT 250 FOR IP): Performed by: NURSE PRACTITIONER

## 2024-01-03 PROCEDURE — 94640 AIRWAY INHALATION TREATMENT: CPT

## 2024-01-03 PROCEDURE — 6370000000 HC RX 637 (ALT 250 FOR IP): Performed by: FAMILY MEDICINE

## 2024-01-03 PROCEDURE — 80048 BASIC METABOLIC PNL TOTAL CA: CPT

## 2024-01-03 PROCEDURE — 2700000000 HC OXYGEN THERAPY PER DAY

## 2024-01-03 PROCEDURE — 94761 N-INVAS EAR/PLS OXIMETRY MLT: CPT

## 2024-01-03 PROCEDURE — 85025 COMPLETE CBC W/AUTO DIFF WBC: CPT

## 2024-01-03 RX ORDER — PREDNISONE 20 MG/1
40 TABLET ORAL DAILY
Qty: 10 TABLET | Refills: 0 | Status: SHIPPED | OUTPATIENT
Start: 2024-01-03 | End: 2024-01-08

## 2024-01-03 RX ORDER — CARVEDILOL 12.5 MG/1
12.5 TABLET ORAL 2 TIMES DAILY WITH MEALS
Qty: 60 TABLET | Refills: 3 | Status: SHIPPED | OUTPATIENT
Start: 2024-01-03

## 2024-01-03 RX ADMIN — CARVEDILOL 12.5 MG: 12.5 TABLET, FILM COATED ORAL at 08:39

## 2024-01-03 RX ADMIN — TIOTROPIUM BROMIDE INHALATION SPRAY 2 PUFF: 3.12 SPRAY, METERED RESPIRATORY (INHALATION) at 08:19

## 2024-01-03 RX ADMIN — MOMETASONE FUROATE AND FORMOTEROL FUMARATE DIHYDRATE 2 PUFF: 200; 5 AEROSOL RESPIRATORY (INHALATION) at 08:19

## 2024-01-03 RX ADMIN — SACUBITRIL AND VALSARTAN 1 TABLET: 24; 26 TABLET, FILM COATED ORAL at 08:38

## 2024-01-03 RX ADMIN — FUROSEMIDE 40 MG: 40 TABLET ORAL at 08:38

## 2024-01-03 RX ADMIN — OXYCODONE AND ACETAMINOPHEN 1 TABLET: 5; 325 TABLET ORAL at 08:39

## 2024-01-03 RX ADMIN — SODIUM CHLORIDE, PRESERVATIVE FREE 10 ML: 5 INJECTION INTRAVENOUS at 08:41

## 2024-01-03 RX ADMIN — ALBUTEROL SULFATE 2 PUFF: 90 AEROSOL, METERED RESPIRATORY (INHALATION) at 08:19

## 2024-01-03 RX ADMIN — RIVAROXABAN 20 MG: 20 TABLET, FILM COATED ORAL at 08:39

## 2024-01-03 ASSESSMENT — PAIN DESCRIPTION - LOCATION: LOCATION: BACK

## 2024-01-03 ASSESSMENT — PAIN SCALES - GENERAL
PAINLEVEL_OUTOF10: 5
PAINLEVEL_OUTOF10: 5

## 2024-01-03 ASSESSMENT — PAIN DESCRIPTION - DESCRIPTORS: DESCRIPTORS: ACHING

## 2024-01-03 ASSESSMENT — PAIN DESCRIPTION - ORIENTATION: ORIENTATION: LOWER

## 2024-01-03 NOTE — PLAN OF CARE
Problem: Discharge Planning  Goal: Discharge to home or other facility with appropriate resources  Outcome: Progressing     Problem: Safety - Adult  Goal: Free from fall injury  Outcome: Progressing     Problem: Pain  Goal: Verbalizes/displays adequate comfort level or baseline comfort level  Outcome: Progressing     Problem: Respiratory - Adult  Goal: Achieves optimal ventilation and oxygenation  Outcome: Progressing     Problem: Metabolic/Fluid and Electrolytes - Adult  Goal: Electrolytes maintained within normal limits  Outcome: Progressing

## 2024-01-03 NOTE — PROGRESS NOTES
Hospitalist Progress Note  1/3/2024 9:30 AM    PCP: Ella Velasquez, ALISE - CNP    [id number]     Date of Admission: 12/29/2023                                                                                                                     HOSPITAL COURSE    Patient demographics:  The patient  Mark Jorge is a 73 y.o. male     Significant past medical history:   Patient Active Problem List   Diagnosis    Lower abdominal pain    Hematuria    HTN (hypertension)    Type 2 diabetes mellitus without complication (HCC)    Constipation    Acute encephalopathy    Septic shock (HCC)    Lactic acidosis    Perforated viscus    Peritonitis, acute generalized (HCC)    SHARONDA (acute kidney injury) (HCC)    Thrombocytopenia (HCC)    Hypernatremia    Leukocytosis    Hyperglycemia    Fecal peritonitis (HCC)    VF (ventricular fibrillation) (HCC)    Cardiomyopathy (HCC)    MRSA (methicillin resistant Staphylococcus aureus) infection    Colostomy in place (HCC)    Tobacco abuse    Perforated sigmoid colon (HCC)    Incisional hernia without obstruction or gangrene    Acute on chronic systolic congestive heart failure (HCC)    Acute respiratory acidosis (HCC)    Acute respiratory failure with hypoxia and hypercarbia (HCC)    Acute pulmonary edema (HCC)    Pleural effusion    Hyperlipidemia    A-fib (HCC)    Centrilobular emphysema (HCC)    Acute congestive heart failure (HCC)    H/O ventral hernia    History of ventral hernia    Acute respiratory failure (HCC)    COPD exacerbation (HCC)    Pain of upper abdomen    Left inguinal hernia    Acute respiratory failure with hypoxia (HCC)    Partial small bowel obstruction (HCC)    Acute exacerbation of chronic obstructive pulmonary disease (COPD) (HCC)    Chronic HFrEF (heart failure with reduced ejection fraction) (HCC)    SIRS (systemic inflammatory response syndrome) (HCC)         Presenting symptoms:      Diagnostic workup:      CONSULTS DURING ADMISSION :   PULMONARY REHAB

## 2024-01-03 NOTE — PROGRESS NOTES
Pulmonary Critical Care Progress Note     Patient's name:  Mark Jorge  Medical Record Number: [id number]  Patient's account/billing number: [id number]  Patient's YOB: 1950  Age: 73 y.o.  Date of Admission: 12/29/2023  1:22 AM  Date of Consult: 1/3/2024      Primary Care Physician: Ella Velasquez APRN - CNP      Code Status: Full Code    Chief complaint: acute on chronic hypercapnic respiratory failure    Assessment and Plan     chronic hypercapnic respiratory failure  COPD with acute exacerbation  Tobacco abuse    Plan:  Ok to d/c from pulmonary stand point   Home oxygen arranged   Bronchodilators  Advised to quit smoking  On anticoagulation  Follow up with Dr. Hunt         Overnight:  Sob is better      REVIEW OF SYSTEMS:  Review of Systems -   General ROS: negative  Psychological ROS: negative  Ophthalmic ROS: negative  ENT ROS: negative  Allergy and Immunology ROS: negative  Hematological and Lymphatic ROS: negative  Endocrine ROS: negative  Breast ROS: negative  Respiratory ROS: Cough, wheezing, shortness of breath  Cardiovascular ROS: no chest pain or dyspnea on exertion  Gastrointestinal ROS:negative  Genito-Urinary ROS: negative  Musculoskeletal ROS: negative  Neurological ROS: negative  Dermatological ROS: negative        Physical Exam:    Vitals: /82   Pulse 71   Temp 97.4 °F (36.3 °C) (Oral)   Resp 18   Ht 1.803 m (5' 11\")   Wt 80.2 kg (176 lb 12.9 oz)   SpO2 97%   BMI 24.66 kg/m²     Last Body weight:   Wt Readings from Last 3 Encounters:   01/03/24 80.2 kg (176 lb 12.9 oz)   06/26/21 87.4 kg (192 lb 10.9 oz)   04/19/21 84.4 kg (186 lb)       Body Mass Index : Body mass index is 24.66 kg/m².      Intake and Output summary:   Intake/Output Summary (Last 24 hours) at 1/3/2024 0950  Last data filed at 1/3/2024 0829  Gross per 24 hour   Intake 480 ml   Output 50 ml   Net 430 ml         Physical Examination:     Gen: mild acute distress  Eyes: PERRL. Anicteric

## 2024-01-03 NOTE — PROGRESS NOTES
Discharge orders acknowledged by RN . Discharge teaching completed with pt. AVS reviewed and all questions answered. Medication regimen reviewed and pt understands schedule. Follow up appointments also reviewed with pt and resources given for discharge. Pt meds sent electronic to be filled and understands schedule. IV removed. Bedside monitor removed from pt. 60+ minutes of education completed. Required core measures completed. Pt vitals WDL. Pt discharged with all belongings to home with friend. Pt transported off of unit via wheelchair. No complications.      Electronically signed by Leona Bonilla RN on 1/3/2024 at 11:07 AM

## 2024-01-03 NOTE — PLAN OF CARE
Problem: Discharge Planning  Goal: Discharge to home or other facility with appropriate resources  1/3/2024 1053 by Leona Bonilla RN  Outcome: Adequate for Discharge  1/3/2024 1052 by Leona Bonilla RN  Outcome: Progressing     Problem: Safety - Adult  Goal: Free from fall injury  1/3/2024 1053 by Leona Bonilla RN  Outcome: Adequate for Discharge  1/3/2024 1052 by Leona Bonilla RN  Outcome: Progressing     Problem: Pain  Goal: Verbalizes/displays adequate comfort level or baseline comfort level  1/3/2024 1053 by Leona Bonilla RN  Outcome: Adequate for Discharge  1/3/2024 1052 by Leona Bonilla RN  Outcome: Progressing     Problem: Respiratory - Adult  Goal: Achieves optimal ventilation and oxygenation  1/3/2024 1053 by Leona Bonilla RN  Outcome: Adequate for Discharge  1/3/2024 1052 by Leona Bonilla RN  Outcome: Progressing     Problem: Metabolic/Fluid and Electrolytes - Adult  Goal: Electrolytes maintained within normal limits  1/3/2024 1053 by Leona Bonilla RN  Outcome: Adequate for Discharge  1/3/2024 1052 by Leona Bonilla RN  Outcome: Progressing

## 2024-01-11 NOTE — DISCHARGE SUMMARY
Hospital Medicine Discharge Summary      Patient ID: Mark Jorge , [id number]     Patient's PCP: Ella Velasquez APRN - CNP    Admit Date: 12/29/2023     Discharge Date: 1/3/2024      Admitting Physician: Caleb Cota MD    Discharge Physician: LEONARDO LANZA MD     Discharge Diagnoses:     Active Hospital Problems    Diagnosis Date Noted    Acute exacerbation of chronic obstructive pulmonary disease (COPD) ([facility]) [J44.1] 12/29/2023    Chronic HFrEF (heart failure with reduced ejection fraction) ([facility]) [I50.22] 12/29/2023    SIRS (systemic inflammatory response syndrome) ([facility]) [R65.10] 12/29/2023    Acute respiratory failure with hypoxia and hypercarbia ([facility]) [J96.01, J96.02] 07/04/2017    Hyperglycemia [R73.9]     Lactic acidosis [E87.20] 09/04/2016         The patient was seen and examined on the day of discharge and this discharge summary is in conjunction with any daily progress note from day of discharge.    HOSPITAL COURSE     Patient demographics:  The patient  Mark Jorge is a 73 y.o. male      Significant past medical history:       Patient Active Problem List   Diagnosis    Lower abdominal pain    Hematuria    HTN (hypertension)    Type 2 diabetes mellitus without complication (HCC)    Constipation    Acute encephalopathy    Septic shock (HCC)    Lactic acidosis    Perforated viscus    Peritonitis, acute generalized (HCC)    SHARONDA (acute kidney injury) (HCC)    Thrombocytopenia (HCC)    Hypernatremia    Leukocytosis    Hyperglycemia    Fecal peritonitis (HCC)    VF (ventricular fibrillation) (HCC)    Cardiomyopathy (HCC)    MRSA (methicillin resistant Staphylococcus aureus) infection    Colostomy in place (HCC)    Tobacco abuse    Perforated sigmoid colon (HCC)    Incisional hernia without obstruction or gangrene    Acute on chronic systolic congestive heart failure (HCC)    Acute respiratory acidosis (HCC)    Acute respiratory failure with hypoxia and hypercarbia (HCC)    Acute

## 2024-02-15 ENCOUNTER — TELEPHONE (OUTPATIENT)
Dept: PULMONOLOGY | Age: 74
End: 2024-02-15

## 2024-02-15 NOTE — TELEPHONE ENCOUNTER
Patient missed his hdf with dr pozo on 2/6. He is doing fine since leaving the hospital. He has not seen nohelia in a long time and did not want to schedule an appt since he is doing well. If he needs to come in in the future he will call for a npv.